# Patient Record
Sex: MALE | Race: BLACK OR AFRICAN AMERICAN | NOT HISPANIC OR LATINO
[De-identification: names, ages, dates, MRNs, and addresses within clinical notes are randomized per-mention and may not be internally consistent; named-entity substitution may affect disease eponyms.]

---

## 2019-06-24 ENCOUNTER — RESULT REVIEW (OUTPATIENT)
Age: 34
End: 2019-06-24

## 2019-06-24 ENCOUNTER — OUTPATIENT (OUTPATIENT)
Dept: OUTPATIENT SERVICES | Facility: HOSPITAL | Age: 34
LOS: 1 days | Discharge: HOME | End: 2019-06-24
Payer: MEDICAID

## 2019-06-24 PROCEDURE — 77003 FLUOROGUIDE FOR SPINE INJECT: CPT | Mod: 26

## 2019-06-24 PROCEDURE — 62270 DX LMBR SPI PNXR: CPT

## 2019-06-24 PROCEDURE — 88108 CYTOPATH CONCENTRATE TECH: CPT | Mod: 26

## 2019-06-24 NOTE — PROGRESS NOTE ADULT - SUBJECTIVE AND OBJECTIVE BOX
Interventional Radiology Outpatient Documentation    PREOPERATIVE DAY OF PROCEDURE EVALUATION:     I have personally seen and examined this patient. I agree with the history and physical which I have reviewed and noted any changes below:     Plan is for diagnostic lumbar punture.  06-24-19 @ 10:34    Procedure/ risks/ benefits/ goals/ alternatives were explained. All questions answered. Informed content obtained from patient. Consent placed in chart.

## 2019-06-24 NOTE — PROCEDURE NOTE - NSPOSTCAREGUIDE_GEN_A_CORE
Instructed patient/caregiver regarding signs and symptoms of infection/Verbal/written post procedure instructions were given to patient/caregiver/Instructed patient/caregiver to follow-up with primary care physician/Keep the cast/splint/dressing clean and dry

## 2019-06-24 NOTE — PROCEDURE NOTE - NSPROCNAME_GEN_A_CORE
Lumbar Puncture [Initial Consultation] : an initial consultation for [Febrile Seizure] : febrile seizure [Mother] : mother

## 2019-06-24 NOTE — PROCEDURE NOTE - ADDITIONAL PROCEDURE DETAILS
Diagnostic LP at L3-L4  opening pressure 29  drained 15.5 cc of clear CSF.  Pt tolerated procedure well.

## 2019-06-27 DIAGNOSIS — G35 MULTIPLE SCLEROSIS: ICD-10-CM

## 2019-06-27 DIAGNOSIS — G51.0 BELL'S PALSY: ICD-10-CM

## 2019-09-17 ENCOUNTER — APPOINTMENT (OUTPATIENT)
Dept: DERMATOLOGY | Facility: CLINIC | Age: 34
End: 2019-09-17

## 2020-11-19 ENCOUNTER — OUTPATIENT (OUTPATIENT)
Dept: OUTPATIENT SERVICES | Facility: HOSPITAL | Age: 35
LOS: 1 days | Discharge: HOME | End: 2020-11-19

## 2020-11-19 ENCOUNTER — APPOINTMENT (OUTPATIENT)
Dept: INTERNAL MEDICINE | Facility: CLINIC | Age: 35
End: 2020-11-19
Payer: MEDICAID

## 2020-11-19 VITALS
HEART RATE: 104 BPM | BODY MASS INDEX: 35.65 KG/M2 | TEMPERATURE: 98.9 F | OXYGEN SATURATION: 95 % | HEIGHT: 73 IN | WEIGHT: 269 LBS | DIASTOLIC BLOOD PRESSURE: 91 MMHG | SYSTOLIC BLOOD PRESSURE: 119 MMHG

## 2020-11-19 DIAGNOSIS — F17.200 NICOTINE DEPENDENCE, UNSPECIFIED, UNCOMPLICATED: ICD-10-CM

## 2020-11-19 DIAGNOSIS — Z86.39 PERSONAL HISTORY OF OTHER ENDOCRINE, NUTRITIONAL AND METABOLIC DISEASE: ICD-10-CM

## 2020-11-19 DIAGNOSIS — Z78.9 OTHER SPECIFIED HEALTH STATUS: ICD-10-CM

## 2020-11-19 DIAGNOSIS — Z23 ENCOUNTER FOR IMMUNIZATION: ICD-10-CM

## 2020-11-19 DIAGNOSIS — Z82.49 FAMILY HISTORY OF ISCHEMIC HEART DISEASE AND OTHER DISEASES OF THE CIRCULATORY SYSTEM: ICD-10-CM

## 2020-11-19 DIAGNOSIS — Z72.0 TOBACCO USE: ICD-10-CM

## 2020-11-19 PROCEDURE — 99203 OFFICE O/P NEW LOW 30 MIN: CPT | Mod: GC

## 2020-11-19 NOTE — REVIEW OF SYSTEMS
[Fever] : no fever [Recent Change In Weight] : ~T no recent weight change [Vision Problems] : no vision problems [Hearing Loss] : no hearing loss [Chest Pain] : no chest pain [Palpitations] : no palpitations [Shortness Of Breath] : no shortness of breath [Wheezing] : no wheezing [Cough] : no cough [Abdominal Pain] : no abdominal pain [Nausea] : no nausea [Constipation] : no constipation [Diarrhea] : no diarrhea [Vomiting] : no vomiting [Dysuria] : no dysuria [Hematuria] : no hematuria [Joint Pain] : no joint pain [Muscle Pain] : no muscle pain [Headache] : no headache [Dizziness] : no dizziness [Anxiety] : no anxiety [Depression] : no depression

## 2020-11-19 NOTE — PHYSICAL EXAM
[No Acute Distress] : no acute distress [Normal Sclera/Conjunctiva] : normal sclera/conjunctiva [PERRL] : pupils equal round and reactive to light [EOMI] : extraocular movements intact [Normal Oropharynx] : the oropharynx was normal [Normal TMs] : both tympanic membranes were normal [Normal Nasal Mucosa] : the nasal mucosa was normal [No Respiratory Distress] : no respiratory distress  [No Accessory Muscle Use] : no accessory muscle use [Clear to Auscultation] : lungs were clear to auscultation bilaterally [Normal Rate] : normal rate  [Regular Rhythm] : with a regular rhythm [Normal S1, S2] : normal S1 and S2 [No Edema] : there was no peripheral edema [Soft] : abdomen soft [Non Tender] : non-tender [Non-distended] : non-distended [No HSM] : no HSM [Normal Bowel Sounds] : normal bowel sounds [No Focal Deficits] : no focal deficits [Normal Affect] : the affect was normal [Alert and Oriented x3] : oriented to person, place, and time [Normal Mood] : the mood was normal [Normal Insight/Judgement] : insight and judgment were intact [de-identified] : 2+ radial pulses b/l  [de-identified] : Left sided facial droop

## 2020-11-19 NOTE — ASSESSMENT
[FreeTextEntry1] : 36yo male from [facility] with pmhx of hyperlipidemia, vit D deficiency, Bell's palsy, obesity, tobacco use disorder, and psych hx of schizoaffective disorder - bipolar type presents to office as a new patient to establish care. \par \par #schizoaffective disorder, bipolar type - stable\par - from [facility]\par - risperidone 3mg hs, risperidone 2mg daily, quetiapine 400mg hs\par \par #bell's palsy\par - since 19yo \par - neurology referral \par \par #hyperlipidemia\par - on atorvastatin 10mg hs, renewed\par - will send lipid profile\par \par #morbid obesity\par - BMI 35\par - counselled on weight loss and diet \par \par #tobacco use disorder\par - currently uses 4 cigarrettes a day, started when he was 16yo and started with 1-2 cigarrettes\par - states he will think about cutting down, seems willing\par \par #healthcare maintenance\par - renewed meds\par - will do routine labs: CBC, CMP, lipid profile, A1C, vit D, TSH\par - f/u 6 months \par - had flu shot 2 months ago \par \par

## 2020-11-19 NOTE — HISTORY OF PRESENT ILLNESS
[FreeTextEntry1] : new visit, establish care  [de-identified] : 34yo male from [facility] with pmhx of hyperlipidemia, vit D deficiency, Bell's palsy, obesity, tobacco use disorder, and psych hx of schizoaffective disorder - bipolar type presents to office as a new patient to establish care. \par \par Patient needs renewal on atovastatin and vit D. Patient has diagnosis of bells palsy which started when he was 19yo and patient states there has been no improvement or worsening since. States he hasn't had any tests done for it yet. Otherwise patient has no complaints and is doing well on his psych meds.

## 2020-11-23 DIAGNOSIS — F25.0 SCHIZOAFFECTIVE DISORDER, BIPOLAR TYPE: ICD-10-CM

## 2020-11-23 DIAGNOSIS — E66.9 OBESITY, UNSPECIFIED: ICD-10-CM

## 2020-11-23 DIAGNOSIS — G51.0 BELL'S PALSY: ICD-10-CM

## 2020-11-23 DIAGNOSIS — E55.9 VITAMIN D DEFICIENCY, UNSPECIFIED: ICD-10-CM

## 2020-11-23 DIAGNOSIS — F17.200 NICOTINE DEPENDENCE, UNSPECIFIED, UNCOMPLICATED: ICD-10-CM

## 2020-11-23 DIAGNOSIS — Z00.00 ENCOUNTER FOR GENERAL ADULT MEDICAL EXAMINATION WITHOUT ABNORMAL FINDINGS: ICD-10-CM

## 2020-11-23 DIAGNOSIS — E78.5 HYPERLIPIDEMIA, UNSPECIFIED: ICD-10-CM

## 2020-12-09 LAB
25(OH)D3 SERPL-MCNC: 41 NG/ML
ALBUMIN SERPL ELPH-MCNC: 4.4 G/DL
ALP BLD-CCNC: 148 U/L
ALT SERPL-CCNC: 17 U/L
ANION GAP SERPL CALC-SCNC: 17 MMOL/L
AST SERPL-CCNC: 18 U/L
BASOPHILS # BLD AUTO: 0.05 K/UL
BASOPHILS NFR BLD AUTO: 0.7 %
BILIRUB SERPL-MCNC: 0.5 MG/DL
BUN SERPL-MCNC: 14 MG/DL
CALCIUM SERPL-MCNC: 9.9 MG/DL
CHLORIDE SERPL-SCNC: 100 MMOL/L
CHOLEST SERPL-MCNC: 170 MG/DL
CO2 SERPL-SCNC: 20 MMOL/L
CREAT SERPL-MCNC: 1.3 MG/DL
EOSINOPHIL # BLD AUTO: 0.35 K/UL
EOSINOPHIL NFR BLD AUTO: 4.7 %
ESTIMATED AVERAGE GLUCOSE: 117 MG/DL
GLUCOSE SERPL-MCNC: 98 MG/DL
HBA1C MFR BLD HPLC: 5.7 %
HCT VFR BLD CALC: 42.5 %
HDLC SERPL-MCNC: 44 MG/DL
HGB BLD-MCNC: 13.8 G/DL
IMM GRANULOCYTES NFR BLD AUTO: 0.3 %
LDLC SERPL CALC-MCNC: 110 MG/DL
LYMPHOCYTES # BLD AUTO: 2.77 K/UL
LYMPHOCYTES NFR BLD AUTO: 37 %
MAN DIFF?: NORMAL
MCHC RBC-ENTMCNC: 26.4 PG
MCHC RBC-ENTMCNC: 32.5 G/DL
MCV RBC AUTO: 81.3 FL
MONOCYTES # BLD AUTO: 0.93 K/UL
MONOCYTES NFR BLD AUTO: 12.4 %
NEUTROPHILS # BLD AUTO: 3.37 K/UL
NEUTROPHILS NFR BLD AUTO: 44.9 %
NONHDLC SERPL-MCNC: 126 MG/DL
PLATELET # BLD AUTO: 251 K/UL
POTASSIUM SERPL-SCNC: 4.6 MMOL/L
PROT SERPL-MCNC: 7.8 G/DL
RBC # BLD: 5.23 M/UL
RBC # FLD: 15.7 %
SODIUM SERPL-SCNC: 137 MMOL/L
TRIGL SERPL-MCNC: 106 MG/DL
TSH SERPL-ACNC: 0.85 UIU/ML
WBC # FLD AUTO: 7.49 K/UL

## 2020-12-22 ENCOUNTER — APPOINTMENT (OUTPATIENT)
Dept: OPHTHALMOLOGY | Facility: CLINIC | Age: 35
End: 2020-12-22

## 2021-03-30 ENCOUNTER — OUTPATIENT (OUTPATIENT)
Dept: OUTPATIENT SERVICES | Facility: HOSPITAL | Age: 36
LOS: 1 days | Discharge: HOME | End: 2021-03-30

## 2021-03-30 ENCOUNTER — APPOINTMENT (OUTPATIENT)
Dept: NEUROLOGY | Facility: CLINIC | Age: 36
End: 2021-03-30
Payer: MEDICAID

## 2021-03-30 VITALS
OXYGEN SATURATION: 97 % | HEART RATE: 96 BPM | HEIGHT: 73 IN | WEIGHT: 225 LBS | DIASTOLIC BLOOD PRESSURE: 87 MMHG | TEMPERATURE: 98.4 F | SYSTOLIC BLOOD PRESSURE: 131 MMHG | BODY MASS INDEX: 29.82 KG/M2

## 2021-03-30 DIAGNOSIS — G37.9 DEMYELINATING DISEASE OF CENTRAL NERVOUS SYSTEM, UNSPECIFIED: ICD-10-CM

## 2021-03-30 PROCEDURE — 99204 OFFICE O/P NEW MOD 45 MIN: CPT | Mod: GC

## 2021-03-31 NOTE — PHYSICAL EXAM
[FreeTextEntry1] : Mental status: Awake, alert and oriented x3.  Recent and remote memory intact.\par Cranial nerves: + L sided LMN facial paralysis. Pupils equally round and reactive to light, no nystagmus, extraocular muscles intact,\par Motor:  MRC grading 5/5 b/l UE/LE.   strength 5/5.  Normal tone and bulk.  No abnormal movements.  \par Sensation: Intact to light touch, proprioception, and pinprick. \par Coordination: No dysmetria on finger-to-nose and heel-to-shin.\par Reflexes: 2+ in bilateral UE/LE, downgoing toes bilaterally. (-) Márquez.\par Gait: Narrow and steady. No ataxia.  Romberg negative\par \par

## 2021-03-31 NOTE — HISTORY OF PRESENT ILLNESS
Called and talked to patient explained procedure for follow up he already needs a repeat CT of chest for heart related problem in 6 weeks so I made an appointment for 8/5/17 with Dr Melissa Beverly [FreeTextEntry1] : The patient is a 35 year old male with history of schizoaffective disorder who currently resides at [facility] and [facility] 15 years ago who was referred by his PCP for neurology evaluation. Patient is a poor historian and is unable to explain why he was sent for a neurology consult today. He states he is for his [place] [facility] but denies any recurrence or worsening of symptoms. He comes with the following records from outside facilities. He reports he has never been told he has multiple sclerosis or been treated for multiple sclerosis. \par \par CT head w and wo contrast 1/31/2019\par Impression: 1 cm low density in the right parietal centrum semiovae. The finding is nonspecific but a focus of demyelination cannot be excluded in a patient of the stated age. MRI of the brain w and wo contrast may provide further elucidation. \par \par MR head w and wo contrast 2/13/2019\par IMpression: \par There is a dominant lesion in the right medial parietal lobe, above the right atrium of lateral ventricle, measuring up to 1.6x1.1 cm in size. A tiny 3 mm lesion is seen just inferior to this as well. A few additional tiny, less than 3 mm subcortical white matter lesions are seen in the frontal lobe. There is no restricted diffusion to suggest acute infarction. The single dominant lesion is nonspecific, but the prominent size and location is worrisome for a demyelinating lesion, and the clinical correlation is recommended. There is no abnormal enhancement to suggest acute demyelination. Otherwise unremarkable. \par \par MR c spine without contrast 3/18/2019\par Impression: there is central disc herniation at C4-C5 with moderate flattening of the cord shadow which measures 5.5 mm on axial image 41 and on sagittal image 7 without evidence of myelopathic signal change. \par 2. The spinal cord is otherwise normal. \par 3. There are no regions of abnormal enhancement. \par \par MR thoracic spine w and wo contrast 3/18/2019\par Impression: the spinal cord is normal. There is no suggestion of demyelinating disease. \par 2. there are no regions of abnormal enhancement. \par \par Record of neurology consult 7/2019 says LP negative for MS.\par \par MR head w and wo contrast 8/12/2019\par IMpression: There is a 1.5 cm lesion adjacent to the posterior body of the right lateral ventricle/splenium of the corpus callosum c/w a demyelinating plaque in this patient with known multiple sclerosis, similar to prior study. \par \par MR Cervical spine with contrast 10/4/2019\par IMpression: 5 mm central disc protrusion at C4-C5 with moderate mass effect on the anterior margin of the cervical sinal cord, similar to the prior study. \par \par MR thoracic spine w and wo contrast 10/4/2019\par impression: no significant abnormality of the thoracic spine. no evidence of demyelinating process. \par \par \par MRI Head w and wo contrast from [facility] 12/2019\par Impression: 1. Examination reveals stable appearance of 11.6 cm rounded T2 hyperintense demyelinating plaque in the right deep parietal periventricular white matter in this patient with a known history of multiple sclerosis. No restricted diffusion or abnormal contrast enhancement is seen to suggest active lesion. No new lesions are seen. \par 2. The remained of the brain parenchyma is unremarkable. No acute infarction, masses, or hemorrhage is seen. No acute intracranial abnormality is identified. There has been no adverse interval change since the previous study. \par 3. Bilateral optic nerves are normal in thickness and signal intensity. No abnormal contrast enhancement is seen. There is no evidence for optic neuritis. \par \par Manual muscle testing/NCS/EMG from [facility] 10/9/2019\par Impression: \par 1. 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 \par 2. C5-C6,m C7 and C8-T1 radiculopathies. \par 3. Continue current management. \par 4. f/u with referring physician. \par 5. PHysical therapy to alleviate pain and muscle spasm, to prevent stiffness, to improve blood circulation, promote healing, and restore normal spine and joint mobility in the affected area consisting of therapeutic exercises. \par \par Manual muscle testing/NCS/EMG from [facility] 11/15/2019\par Impression: \par 1. 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 \par 2. Continue current management of symptoms \par 3. FOllow up with referring physicians\par 4. Physical Therapy: to alleviate pain and muscle spasm, to prevent stiffness, to improve blood circulation, promote healing, and restore normal spine and joint mobility in the affected area consisting of therapeutic exercises. \par

## 2021-03-31 NOTE — ASSESSMENT
[FreeTextEntry1] : 35 year old male with history of schizoaffective disorder and Bulan palsy who currently resides at [facility] with questionable history of multiple sclerosis based on outside medical records. Patient has no acute complaint. Neurological exam is normal with the exception of left facial paralysis 2/2 bell's palsy. \par \par # demyelinating lesions of CNS and PNS, ?MS diagnosis\par repeat testing to evaluate disease progression/ r/o MS\par - repeat MRI of brain and cervical spine w/wo contrast \par - repeat NCS/EMG: Unlikely to be correct. Since patient's exam is normal and CNS should not affect the PNS \par \par # bells palsy episode 15 years ago\par - never treated\par - no acute changes\par - no indication for medical treatment

## 2021-04-16 LAB
ANION GAP SERPL CALC-SCNC: 9 MMOL/L
BUN SERPL-MCNC: 12 MG/DL
CALCIUM SERPL-MCNC: 9.7 MG/DL
CHLORIDE SERPL-SCNC: 102 MMOL/L
CO2 SERPL-SCNC: 25 MMOL/L
CREAT SERPL-MCNC: 1.3 MG/DL
GLUCOSE SERPL-MCNC: 102 MG/DL
POTASSIUM SERPL-SCNC: 4.3 MMOL/L
SODIUM SERPL-SCNC: 136 MMOL/L

## 2021-04-21 ENCOUNTER — OUTPATIENT (OUTPATIENT)
Dept: OUTPATIENT SERVICES | Facility: HOSPITAL | Age: 36
LOS: 1 days | Discharge: HOME | End: 2021-04-21

## 2021-04-21 ENCOUNTER — APPOINTMENT (OUTPATIENT)
Dept: INTERNAL MEDICINE | Facility: CLINIC | Age: 36
End: 2021-04-21
Payer: MEDICAID

## 2021-04-21 VITALS
TEMPERATURE: 97.3 F | SYSTOLIC BLOOD PRESSURE: 122 MMHG | HEART RATE: 101 BPM | DIASTOLIC BLOOD PRESSURE: 88 MMHG | HEIGHT: 73 IN | BODY MASS INDEX: 35.25 KG/M2 | OXYGEN SATURATION: 95 % | WEIGHT: 266 LBS

## 2021-04-21 DIAGNOSIS — Z00.00 ENCOUNTER FOR GENERAL ADULT MEDICAL EXAMINATION W/OUT ABNORMAL FINDINGS: ICD-10-CM

## 2021-04-21 DIAGNOSIS — E55.9 VITAMIN D DEFICIENCY, UNSPECIFIED: ICD-10-CM

## 2021-04-21 PROCEDURE — 99214 OFFICE O/P EST MOD 30 MIN: CPT

## 2021-04-21 NOTE — HISTORY OF PRESENT ILLNESS
[FreeTextEntry1] : Follow up.  Medication refill. [de-identified] : 36yo male from [facility] with pmhx of hyperlipidemia, vit D deficiency, Bell's palsy (left sided facial droop), obesity, tobacco use disorder, and psych hx of schizoaffective disorder - bipolar type presents to office as a follow up visit.  Was last seen in the clinic by Dr. Arreguin in 11/2020.  \par \par Patient needs renewal on atorvastatin and vit D. Patient has diagnosis of bells palsy which started when he was 21yo and patient states that it has been stable.   He is being seen by Dr. Manzo.  Will follow up with him.  Otherwise patient has no complaints and is doing well on his psych meds.  Smokes 4 cigs/day for past 9 years.  Smokes marijuana daily.

## 2021-04-21 NOTE — ASSESSMENT
[FreeTextEntry1] : 36yo male from [facility] with pmhx of hyperlipidemia, vit D deficiency, Bell's palsy, obesity, tobacco use disorder, and psych hx of schizoaffective disorder - bipolar type presents to office as a new patient to establish care. \par \par #schizoaffective disorder, bipolar type - stable\par - from [facility]\par - risperidone 3mg hs, risperidone 2mg daily, quetiapine 400mg hs\par \par #bell's palsy/ Demyelinating disease\par - since 19yo \par - Sees Dr. Pham (Neurology) who recommended MRI and repeat NCS\par - Will follow up with Neuro\par \par #hyperlipidemia\par - on atorvastatin 10mg hs, renewed\par - will send repeat lipid profile 12/2020  (elev) HbA1c 5.7\par \par #morbid obesity\par - BMI 35\par - counselled on weight loss and diet \par \par #tobacco use disorder/ Marijuana use\par - currently uses 4 cigarrettes a day, started when he was 16yo and started with 1-2 cigarrettes\par - states he will think about cutting down, seems willing\par \par #healthcare maintenance\par - renewed meds\par - will do repeat routine labs: CBC, CMP, lipid profile, A1C, vit D, TSH\par - f/u 6 months \par - had flu shot 2020\par \par

## 2021-04-28 DIAGNOSIS — E78.5 HYPERLIPIDEMIA, UNSPECIFIED: ICD-10-CM

## 2021-04-28 DIAGNOSIS — F25.0 SCHIZOAFFECTIVE DISORDER, BIPOLAR TYPE: ICD-10-CM

## 2021-04-28 DIAGNOSIS — E66.9 OBESITY, UNSPECIFIED: ICD-10-CM

## 2021-04-28 DIAGNOSIS — G51.0 BELL'S PALSY: ICD-10-CM

## 2021-04-28 DIAGNOSIS — Z00.00 ENCOUNTER FOR GENERAL ADULT MEDICAL EXAMINATION WITHOUT ABNORMAL FINDINGS: ICD-10-CM

## 2021-04-28 DIAGNOSIS — E55.9 VITAMIN D DEFICIENCY, UNSPECIFIED: ICD-10-CM

## 2021-06-01 ENCOUNTER — APPOINTMENT (OUTPATIENT)
Dept: NEUROLOGY | Facility: CLINIC | Age: 36
End: 2021-06-01

## 2021-06-07 RX ORDER — ATORVASTATIN CALCIUM 10 MG/1
10 TABLET, FILM COATED ORAL
Qty: 30 | Refills: 5 | Status: ACTIVE | COMMUNITY
Start: 2020-11-19 | End: 1900-01-01

## 2021-10-18 RX ORDER — MULTIVIT-MIN/FOLIC/VIT K/LYCOP 400-300MCG
25 MCG TABLET ORAL DAILY
Qty: 30 | Refills: 3 | Status: ACTIVE | COMMUNITY
Start: 2020-11-19 | End: 1900-01-01

## 2021-10-27 ENCOUNTER — APPOINTMENT (OUTPATIENT)
Dept: INTERNAL MEDICINE | Facility: CLINIC | Age: 36
End: 2021-10-27

## 2022-02-01 ENCOUNTER — APPOINTMENT (OUTPATIENT)
Dept: NEUROLOGY | Facility: CLINIC | Age: 37
End: 2022-02-01

## 2022-08-02 ENCOUNTER — RESULT REVIEW (OUTPATIENT)
Age: 37
End: 2022-08-02

## 2022-08-02 ENCOUNTER — OUTPATIENT (OUTPATIENT)
Dept: OUTPATIENT SERVICES | Facility: HOSPITAL | Age: 37
LOS: 1 days | Discharge: HOME | End: 2022-08-02

## 2022-08-02 DIAGNOSIS — G37.9 DEMYELINATING DISEASE OF CENTRAL NERVOUS SYSTEM, UNSPECIFIED: ICD-10-CM

## 2022-08-02 PROCEDURE — 70553 MRI BRAIN STEM W/O & W/DYE: CPT | Mod: 26

## 2022-08-02 PROCEDURE — 72156 MRI NECK SPINE W/O & W/DYE: CPT | Mod: 26

## 2022-08-23 ENCOUNTER — APPOINTMENT (OUTPATIENT)
Dept: NEUROLOGY | Facility: CLINIC | Age: 37
End: 2022-08-23

## 2022-08-23 ENCOUNTER — OUTPATIENT (OUTPATIENT)
Dept: OUTPATIENT SERVICES | Facility: HOSPITAL | Age: 37
LOS: 1 days | Discharge: HOME | End: 2022-08-23

## 2022-08-23 VITALS
BODY MASS INDEX: 34.99 KG/M2 | WEIGHT: 264 LBS | TEMPERATURE: 97.4 F | OXYGEN SATURATION: 98 % | HEIGHT: 73 IN | SYSTOLIC BLOOD PRESSURE: 144 MMHG | HEART RATE: 89 BPM | DIASTOLIC BLOOD PRESSURE: 93 MMHG

## 2022-08-23 PROCEDURE — 99213 OFFICE O/P EST LOW 20 MIN: CPT | Mod: GC

## 2022-08-23 NOTE — PHYSICAL EXAM
[Cranial Nerves Optic (II)] : visual acuity intact bilaterally,  visual fields full to confrontation, pupils equal round and reactive to light [Cranial Nerves Oculomotor (III)] : extraocular motion intact [Cranial Nerves Trigeminal (V)] : facial sensation intact symmetrically [Cranial Nerves Glossopharyngeal (IX)] : tongue and palate midline [Cranial Nerves Accessory (XI - Cranial And Spinal)] : head turning and shoulder shrug symmetric [Cranial Nerves Hypoglossal (XII)] : there was no tongue deviation with protrusion [Motor Tone] : muscle tone was normal in all four extremities [Motor Strength] : muscle strength was normal in all four extremities [Involuntary Movements] : no involuntary movements were seen [Motor Handedness Right-Handed] : the patient is right hand dominant [Sensation Tactile Decrease] : light touch was intact [Abnormal Walk] : normal gait [Coordination - Dysmetria Impaired Finger-to-Nose Bilateral] : not present [FreeTextEntry5] : Left facial LMN weakness

## 2022-08-23 NOTE — END OF VISIT
[] : Resident [FreeTextEntry3] : Visited the patient with resident. Patient with history of CNS demyelinating disease present as a follow up visit. Since last visit MRI and cervical MRI resulted. Showed no acute demyelinating disease but there are bilateral periventricular lesion and few scatted demyelinating punctate changes suspected for CNS demyelinating disease. Does not meet the  Angel Luis criteria. Patient is asymptomatic except left LMN [place] palsy. Normal and sensory exam. At this point, recommend to continue to monitor the patient clinically. Repeat MRI brain, cervical spine and thoracic spine in 6 months w/wo for now and would consider IMT if there was a progress

## 2022-08-23 NOTE — ASSESSMENT
[FreeTextEntry1] : 37 years old male is here for follow up on previous H/O demyelinating disease. His recent MRI of brain 08/02/22:  concerning for demyelinating disease but no active enhancing lesion and its not fully consistent with MS, CSF study from 2019: no OGB and normal IgG index. No lab work up for MBP, MOG or NMO.. Patient never had any flare up symptoms, still asymptomatic. Neuro exam: unremarkable except the chronic facial nerve palsy. \par \par Plan:\par - Will repeat MRI of brain , C-spine and also T-spine in 6 months to assess the progression of lesions. should be done w/wo contrast \par - If lesions are progressive will consider treatment with disease modifying disease like Vumerity.\par - Will also obtain NMO ab, Anti-MOG Ab today.\par -  RTC in 6 months following the MRIs.\par -

## 2022-08-23 NOTE — HISTORY OF PRESENT ILLNESS
[FreeTextEntry1] : 35 year old male with history of schizoaffective disorder who currently resides at [facility] and [facility] 15 years ago is here follow up. He was first seen in March, 2022 for evaluation, outside records revealed questionable Dx of MS? vs other demyelinating disease. He has been doing well since last visit. Denies any blurry vision, weakness or numbness. Denies any trouble with walking, ambulate independently. \par \par Since last visit Brain MRI w/wo and cervical spine MRI w/wo were done. Brain MRI showed 1.7 cm focus of FLAIR signal hyperintensity within the right periventricular white matter extending into the corpus callosum with volume loss and dditional faint FLAIR signal hyperintensity within the left periventricular white matter in corpus callosum with volume loss; and a few scattered punctate foci of subcortical white matter signal abnormality suggestive of CNS demyelinating disease. His CSF study showed negative OCB and normal IgG index.

## 2022-10-20 ENCOUNTER — APPOINTMENT (OUTPATIENT)
Dept: UROLOGY | Facility: CLINIC | Age: 37
End: 2022-10-20

## 2022-10-20 ENCOUNTER — OUTPATIENT (OUTPATIENT)
Dept: OUTPATIENT SERVICES | Facility: HOSPITAL | Age: 37
LOS: 1 days | Discharge: HOME | End: 2022-10-20

## 2022-10-20 ENCOUNTER — NON-APPOINTMENT (OUTPATIENT)
Age: 37
End: 2022-10-20

## 2022-10-20 VITALS
WEIGHT: 252 LBS | BODY MASS INDEX: 33.4 KG/M2 | HEART RATE: 93 BPM | OXYGEN SATURATION: 99 % | HEIGHT: 73 IN | DIASTOLIC BLOOD PRESSURE: 105 MMHG | SYSTOLIC BLOOD PRESSURE: 152 MMHG

## 2022-10-20 DIAGNOSIS — N52.8 OTHER MALE ERECTILE DYSFUNCTION: ICD-10-CM

## 2022-10-20 DIAGNOSIS — G51.0 BELL'S PALSY: ICD-10-CM

## 2022-10-20 DIAGNOSIS — F25.0 SCHIZOAFFECTIVE DISORDER, BIPOLAR TYPE: ICD-10-CM

## 2022-10-20 DIAGNOSIS — G37.9 DEMYELINATING DISEASE OF CENTRAL NERVOUS SYSTEM, UNSPECIFIED: ICD-10-CM

## 2022-10-20 PROCEDURE — 99202 OFFICE O/P NEW SF 15 MIN: CPT

## 2022-10-20 NOTE — HISTORY OF PRESENT ILLNESS
[FreeTextEntry1] : 37-year-old male who complains of inability to have adequate erections.  He does have some erections with stimulation but has had difficulty penetrating.  This is a relatively recent thing.  The patient has a history of schizoaffective disorder and is being worked up for possible neuromyelitis optica.  He is currently on risperidone and A statin.  He denies any hematuria dysuria UTIs or stones or any significant voiding related issues.

## 2022-10-20 NOTE — ASSESSMENT
[FreeTextEntry1] : 37-year-old male with schizoaffective disorder and possibility of demyelinating disease who complains of erectile dysfunction.  He is able to get some erections but they are not firm and he feels for intercourse.  We would need to find out from the facility whether there were any contraindications of his using sildenafil or tadalafil and a low dose.  If there are none then that could be tried.  He was interested in finding out if there were any other tests that needed to be done and have suggested a referral with Dr. Cottrell our erectile dysfunction expert to see if anything further needs to be done at present.  All his questions were otherwise answered.

## 2022-10-20 NOTE — PHYSICAL EXAM
[General Appearance - Well Nourished] : well nourished [Normal Appearance] : normal appearance [General Appearance - In No Acute Distress] : no acute distress [Oriented To Time, Place, And Person] : oriented to person, place, and time

## 2022-12-01 ENCOUNTER — OUTPATIENT (OUTPATIENT)
Dept: OUTPATIENT SERVICES | Facility: HOSPITAL | Age: 37
LOS: 1 days | Discharge: HOME | End: 2022-12-01

## 2022-12-01 ENCOUNTER — APPOINTMENT (OUTPATIENT)
Dept: UROLOGY | Facility: CLINIC | Age: 37
End: 2022-12-01

## 2022-12-01 VITALS
TEMPERATURE: 97.6 F | OXYGEN SATURATION: 97 % | HEART RATE: 95 BPM | WEIGHT: 265 LBS | HEIGHT: 73 IN | BODY MASS INDEX: 35.12 KG/M2 | SYSTOLIC BLOOD PRESSURE: 146 MMHG | DIASTOLIC BLOOD PRESSURE: 105 MMHG

## 2022-12-01 DIAGNOSIS — F25.0 SCHIZOAFFECTIVE DISORDER, BIPOLAR TYPE: ICD-10-CM

## 2022-12-01 DIAGNOSIS — N52.8 OTHER MALE ERECTILE DYSFUNCTION: ICD-10-CM

## 2022-12-01 DIAGNOSIS — N50.0 ATROPHY OF TESTIS: ICD-10-CM

## 2022-12-01 PROCEDURE — 99214 OFFICE O/P EST MOD 30 MIN: CPT

## 2022-12-06 LAB
ALBUMIN SERPL ELPH-MCNC: 4.2 G/DL
ALP BLD-CCNC: 145 U/L
ALT SERPL-CCNC: 15 U/L
AST SERPL-CCNC: 17 U/L
BASOPHILS # BLD AUTO: 0.06 K/UL
BASOPHILS NFR BLD AUTO: 0.8 %
BILIRUB DIRECT SERPL-MCNC: <0.2 MG/DL
BILIRUB INDIRECT SERPL-MCNC: >0.3 MG/DL
BILIRUB SERPL-MCNC: 0.5 MG/DL
EOSINOPHIL # BLD AUTO: 0.45 K/UL
EOSINOPHIL NFR BLD AUTO: 6 %
HCT VFR BLD CALC: 41.3 %
HGB BLD-MCNC: 13.7 G/DL
IMM GRANULOCYTES NFR BLD AUTO: 0.4 %
LYMPHOCYTES # BLD AUTO: 2.74 K/UL
LYMPHOCYTES NFR BLD AUTO: 36.6 %
MAN DIFF?: NORMAL
MCHC RBC-ENTMCNC: 27.2 PG
MCHC RBC-ENTMCNC: 33.2 G/DL
MCV RBC AUTO: 81.9 FL
MONOCYTES # BLD AUTO: 0.82 K/UL
MONOCYTES NFR BLD AUTO: 11 %
NEUTROPHILS # BLD AUTO: 3.38 K/UL
NEUTROPHILS NFR BLD AUTO: 45.2 %
PLATELET # BLD AUTO: 236 K/UL
PROT SERPL-MCNC: 7.5 G/DL
RBC # BLD: 5.04 M/UL
RBC # FLD: 15 %
WBC # FLD AUTO: 7.48 K/UL

## 2022-12-06 NOTE — HISTORY OF PRESENT ILLNESS
[Erectile Dysfunction] : Erectile Dysfunction [FreeTextEntry1] : James is a 37-year-old male born on August 5 1985 initially seen October 20, 2022 by Dr. Weber complaining of insufficient rigidity of his erections.  Patient has a history of schizoaffective disorder possibly neuromyelitis and denies any other voiding issues.\par \par Questions were addressed to the Center where he lives to see if there are any contraindications to PDE 5 inhibitors and the patient was to get a note and come back possibly for subspecialty consultation.\par \par No blood tests were done they are available to me in over a year\par \par He was not able to get a note from the residence.

## 2022-12-06 NOTE — ASSESSMENT
[FreeTextEntry1] : He says he was not able to get a note from the residents I will fill out a request that we will go to them then and/or his primary care doctor to see if his possible comorbid conditions and/or his place of residence are contraindications to PDE fives.\par \par From a urologic side he has testicular atrophy and has morbid obesity going to want hormone studies to see if there is hypergonadism as if so that is a medical issue let alone a psychosexual issue

## 2022-12-06 NOTE — LETTER HEADER
[FreeTextEntry3] : Jolly Hurtado M.D.\par Director Emeritus of Urology\par [facility]\par [address] [place] [address], [address]\[place] [address]

## 2022-12-06 NOTE — PHYSICAL EXAM
[General Appearance - Well Developed] : well developed [General Appearance - Well Nourished] : well nourished [Normal Appearance] : normal appearance [Well Groomed] : well groomed [General Appearance - In No Acute Distress] : no acute distress [Abdomen Soft] : soft [Abdomen Tenderness] : non-tender [Abdomen Hernia] : no hernia was discovered [Costovertebral Angle Tenderness] : no ~M costovertebral angle tenderness [Penis Abnormality] : normal circumcised penis [] : no respiratory distress [Respiration, Rhythm And Depth] : normal respiratory rhythm and effort [Exaggerated Use Of Accessory Muscles For Inspiration] : no accessory muscle use [Oriented To Time, Place, And Person] : oriented to person, place, and time [Affect] : the affect was normal [Mood] : the mood was normal [Not Anxious] : not anxious [Normal Station and Gait] : the gait and station were normal for the patient's age [FreeTextEntry1] : Normal cavernosal size, bilateral testicular atrophy

## 2022-12-07 ENCOUNTER — NON-APPOINTMENT (OUTPATIENT)
Age: 37
End: 2022-12-07

## 2022-12-07 DIAGNOSIS — G51.0 BELL'S PALSY: ICD-10-CM

## 2022-12-07 DIAGNOSIS — F25.0 SCHIZOAFFECTIVE DISORDER, BIPOLAR TYPE: ICD-10-CM

## 2022-12-07 LAB
ESTRADIOL SERPL-MCNC: 10 PG/ML
LH SERPL-ACNC: 4.8 IU/L

## 2022-12-08 LAB — PROLACTIN SERPL-MCNC: 99.8 NG/ML

## 2022-12-09 LAB
TESTOSTERONE FREE/WEAKLY BND: 29.1 NG/DL
TESTOSTERONE TOTAL S: 102 NG/DL
TESTOSTERONE, % FREE/WEAKLY BND: 28.5 %

## 2022-12-12 LAB
SHBG SERPL-SCNC: 21.7 NMOL/L
TESTOST FREE SERPL-MCNC: 3.5 PG/ML
TESTOST SERPL-MCNC: 101 NG/DL

## 2022-12-29 ENCOUNTER — APPOINTMENT (OUTPATIENT)
Dept: UROLOGY | Facility: CLINIC | Age: 37
End: 2022-12-29

## 2023-02-10 LAB
BASOPHILS # BLD AUTO: 0.06 K/UL
BASOPHILS NFR BLD AUTO: 0.9 %
EOSINOPHIL # BLD AUTO: 0.41 K/UL
EOSINOPHIL NFR BLD AUTO: 5.8 %
ESTRADIOL SERPL-MCNC: 18 PG/ML
FSH SERPL-MCNC: 2.8 IU/L
HCT VFR BLD CALC: 45.5 %
HGB BLD-MCNC: 14.5 G/DL
IMM GRANULOCYTES NFR BLD AUTO: 0.3 %
LH SERPL-ACNC: 6.4 IU/L
LYMPHOCYTES # BLD AUTO: 2.86 K/UL
LYMPHOCYTES NFR BLD AUTO: 40.8 %
MAN DIFF?: NORMAL
MCHC RBC-ENTMCNC: 26.6 PG
MCHC RBC-ENTMCNC: 31.9 G/DL
MCV RBC AUTO: 83.5 FL
MONOCYTES # BLD AUTO: 0.62 K/UL
MONOCYTES NFR BLD AUTO: 8.8 %
NEUTROPHILS # BLD AUTO: 3.04 K/UL
NEUTROPHILS NFR BLD AUTO: 43.4 %
PLATELET # BLD AUTO: 253 K/UL
RBC # BLD: 5.45 M/UL
RBC # FLD: 15.4 %
WBC # FLD AUTO: 7.01 K/UL

## 2023-03-02 ENCOUNTER — APPOINTMENT (OUTPATIENT)
Dept: UROLOGY | Facility: CLINIC | Age: 38
End: 2023-03-02
Payer: MEDICAID

## 2023-03-02 ENCOUNTER — OUTPATIENT (OUTPATIENT)
Dept: OUTPATIENT SERVICES | Facility: HOSPITAL | Age: 38
LOS: 1 days | End: 2023-03-02
Payer: MEDICAID

## 2023-03-02 ENCOUNTER — NON-APPOINTMENT (OUTPATIENT)
Age: 38
End: 2023-03-02

## 2023-03-02 ENCOUNTER — APPOINTMENT (OUTPATIENT)
Dept: UROLOGY | Facility: CLINIC | Age: 38
End: 2023-03-02

## 2023-03-02 VITALS
HEART RATE: 112 BPM | BODY MASS INDEX: 37.37 KG/M2 | TEMPERATURE: 97.5 F | HEIGHT: 73 IN | DIASTOLIC BLOOD PRESSURE: 100 MMHG | OXYGEN SATURATION: 97 % | SYSTOLIC BLOOD PRESSURE: 152 MMHG | WEIGHT: 282 LBS

## 2023-03-02 DIAGNOSIS — Z00.00 ENCOUNTER FOR GENERAL ADULT MEDICAL EXAMINATION WITHOUT ABNORMAL FINDINGS: ICD-10-CM

## 2023-03-02 DIAGNOSIS — N52.9 MALE ERECTILE DYSFUNCTION, UNSPECIFIED: ICD-10-CM

## 2023-03-02 DIAGNOSIS — R79.89 OTHER SPECIFIED ABNORMAL FINDINGS OF BLOOD CHEMISTRY: ICD-10-CM

## 2023-03-02 PROCEDURE — 99214 OFFICE O/P EST MOD 30 MIN: CPT

## 2023-03-02 NOTE — PHYSICAL EXAM
[General Appearance - Well Developed] : well developed [General Appearance - Well Nourished] : well nourished [Normal Appearance] : normal appearance [Well Groomed] : well groomed [General Appearance - In No Acute Distress] : no acute distress [Abdomen Soft] : soft [Abdomen Tenderness] : non-tender [Abdomen Hernia] : no hernia was discovered [Costovertebral Angle Tenderness] : no ~M costovertebral angle tenderness [Penis Abnormality] : normal circumcised penis [FreeTextEntry1] : Normal cavernosal size, bilateral testicular atrophy [] : no respiratory distress [Respiration, Rhythm And Depth] : normal respiratory rhythm and effort [Exaggerated Use Of Accessory Muscles For Inspiration] : no accessory muscle use [Oriented To Time, Place, And Person] : oriented to person, place, and time [Affect] : the affect was normal [Mood] : the mood was normal [Not Anxious] : not anxious [Normal Station and Gait] : the gait and station were normal for the patient's age

## 2023-03-02 NOTE — ASSESSMENT
[FreeTextEntry1] : 36 yo with elevated prolactin\par will be seeing Endocrinology in APril\par \par test levels noted\par US of the scrotum\par f/u with Dr. Juan for further mgt

## 2023-03-02 NOTE — HISTORY OF PRESENT ILLNESS
[FreeTextEntry1] : 37 year old male initially seen October 20, 2022 by Dr. Weber complaining of insufficient rigidity of his erections.  Patient has a history of schizoaffective disorder possibly neuromyelitis and denies any other voiding issues.\par \par Prolactin \par 2/2023 - 75.5 ng/ml\par 12/2022 - 99.8 ng/ml\par \par FSH and LH - wnl\par LH - 6.4 IU/L\par FSH - 2.8 IU/L\par \par 12/2022\par SHBG - 24.7 nmoll.L\par Test / Free T - 82 / 20\par \par MRI brain - 8/2022\par 1.7 cm focus of FLAIR hyperintensity in the right periventricular \par finding compatible with demyelinating diseases [Erectile Dysfunction] : Erectile Dysfunction

## 2023-04-19 ENCOUNTER — APPOINTMENT (OUTPATIENT)
Dept: ENDOCRINOLOGY | Facility: CLINIC | Age: 38
End: 2023-04-19
Payer: MEDICAID

## 2023-04-19 ENCOUNTER — OUTPATIENT (OUTPATIENT)
Dept: OUTPATIENT SERVICES | Facility: HOSPITAL | Age: 38
LOS: 1 days | End: 2023-04-19
Payer: MEDICAID

## 2023-04-19 VITALS
HEART RATE: 121 BPM | WEIGHT: 280 LBS | SYSTOLIC BLOOD PRESSURE: 162 MMHG | BODY MASS INDEX: 36.94 KG/M2 | DIASTOLIC BLOOD PRESSURE: 102 MMHG

## 2023-04-19 DIAGNOSIS — E78.5 HYPERLIPIDEMIA, UNSPECIFIED: ICD-10-CM

## 2023-04-19 DIAGNOSIS — Z00.00 ENCOUNTER FOR GENERAL ADULT MEDICAL EXAMINATION WITHOUT ABNORMAL FINDINGS: ICD-10-CM

## 2023-04-19 DIAGNOSIS — R79.89 OTHER SPECIFIED ABNORMAL FINDINGS OF BLOOD CHEMISTRY: ICD-10-CM

## 2023-04-19 PROCEDURE — 99204 OFFICE O/P NEW MOD 45 MIN: CPT

## 2023-04-19 PROCEDURE — 99204 OFFICE O/P NEW MOD 45 MIN: CPT | Mod: GC

## 2023-04-19 RX ORDER — QUETIAPINE FUMARATE 400 MG/1
400 TABLET ORAL
Qty: 1 | Refills: 0 | Status: DISCONTINUED | COMMUNITY
Start: 2020-11-19 | End: 2023-04-19

## 2023-04-20 NOTE — REASON FOR VISIT
[Initial Evaluation] : an initial evaluation [Pituitary Evaluation/ Disorder] : pituitary evaluation/disorder [Hypogonadism] : hypogonadism

## 2023-04-20 NOTE — ASSESSMENT
[FreeTextEntry1] : 37 year old male patient with history of Bell's palsy , HTN , ? Schizoaffective disorder who present for evaluation of elevated prolactin concern for hypogonadism \par \par \par #elevated prolactin, low testosterone \par -  patient reports he was started on risperdal in 2019 when he was admitted to inpatient psych and has been on it since , he is now working with new psychiatrist to wean him off as he believes this medication made him worse .\par - he recently saw urology and was evaluated as he was having erectile dysfunction and unable to maintain erections, he has normal libido but loss of morning erections, denies taking any OTC or steroids or shots \par he has some decrease in testicular size and .\par labs showed elevated prolactin twice , testosterone low with inappropriate FSH , LH ?\par 8/2022 did not show pituitary pathology but not dedicated \par \par discussed with patient that given he is planned to be weaned off Risperdal , we will retest his pituitary function and prolactin level after 1 month being off and if levels high then will need a repeat MRI of pituitary gland and will benefit from treatment with cabergoline , meanwhile he will follow up with urology also to have work up done ( planned for scrotal US ) , and with neurology he agree with plan he will notify me if any changes, then we will do pituitary MRI earlier .

## 2023-04-20 NOTE — DATA REVIEWED
[FreeTextEntry1] : Prolactin \par 2/2023 - 75.5 ng/ml\par 12/2022 - 99.8 ng/ml\par \par FSH and LH - wnl\par LH - 6.4 IU/L\par FSH - 2.8 IU/L\par \par 12/2022\par SHBG - 24.7 nmoll.L\par Test / Free T - 82 / 20\par \par MRI brain - 8/2022\par 1.7 cm focus of FLAIR hyperintensity in the right periventricular \par finding compatible with demyelinating diseases

## 2023-04-20 NOTE — HISTORY OF PRESENT ILLNESS
[FreeTextEntry1] : 37 year old male patient with history of Bell's palsy , HTN , ? Schizoaffective disorder who present for evaluation of elevated prolactin concern for hypogonadism \par \par \par # patient reports he was started on risperdal in 2019 when he was admitted to inpatient psych and has been on it since , he is now working with new psychiatrist to wean him off as he believes this medication made him worse .\par - he recently saw urology and was evaluated as he was having erectile dysfunction and unable to maintain erections, he has normal libido but loss of morning erections, denies taking any OTC or steroids or shots \par he has some decrease in testicular size and .\par labs showed elevated prolactin twice \par \par Prolactin \par 2/2023 - 75.5 ng/ml\par 12/2022 - 99.8 ng/ml\par \par FSH and LH - wnl\par LH - 6.4 IU/L\par FSH - 2.8 IU/L\par \par 12/2022\par SHBG - 24.7 nmoll.L\par Test / Free T - 82 / 20\par \par MRI brain - 8/2022\par 1.7 cm focus of FLAIR hyperintensity in the right periventricular \par finding compatible with demyelinating diseases \par \par to note that he was also diagnosed with possible MS but not following with neurology \par \par \par \par \par \par [id number]  Dr mccoy  ( psychiatry )\par \par

## 2023-04-20 NOTE — END OF VISIT
Need previsit labs-See pending orders and close encounter./Tash Bartholomew,         Chol check 5/2/17    
This patient has a lab only appointment on 4/19/2017 but does not have future orders. Please review, associate diagnosis and sign pending lab orders for the upcoming appointment.  She has an appointment with Dr. Cerda on 5/2/2017.    Thank you,    [facility]    
[Time Spent: ___ minutes] : I have spent [unfilled] minutes of time on the encounter.

## 2023-04-20 NOTE — PHYSICAL EXAM
[Alert] : alert [Obese] : obese [No Acute Distress] : no acute distress [Thyroid Not Enlarged] : the thyroid was not enlarged [No Respiratory Distress] : no respiratory distress [No Accessory Muscle Use] : no accessory muscle use [Clear to Auscultation] : lungs were clear to auscultation bilaterally [Normal S1, S2] : normal S1 and S2 [No Murmurs] : no murmurs [Regular Rhythm] : with a regular rhythm [No Edema] : no peripheral edema [Gynecomastia] : gynecomastia present [Not Tender] : non-tender [Not Distended] : not distended [Soft] : abdomen soft [No Stigmata of Cushings Syndrome] : no stigmata of Cushings Syndrome [Abdominal Striae] : no abdominal striae [Acanthosis Nigricans] : acanthosis nigricans present [Acne] : no acne [No Tremors] : no tremors [Oriented x3] : oriented to person, place, and time [de-identified] : left facial droop

## 2023-04-20 NOTE — REVIEW OF SYSTEMS
[Fatigue] : fatigue [Recent Weight Gain (___ Lbs)] : recent weight gain: [unfilled] lbs [Dysphagia] : no dysphagia [Dysphonia] : no dysphonia [Chest Pain] : no chest pain [Palpitations] : no palpitations [Fast Heart Rate] : heart rate is not fast [Shortness Of Breath] : no shortness of breath [SOB on Exertion] : no shortness of breath on exertion [Nausea] : no nausea [Constipation] : no constipation [Diarrhea] : no diarrhea [Vomiting] : no vomiting [Erectile Dysfunction] : erectile dysfunction [Incontinence] : no incontinence [Decreased Libido] : no decreased libido [Acanthosis] : acanthosis [Headaches] : no headaches [Dizziness] : no dizziness [Tremors] : no tremors [As Noted in HPI] : as noted in HPI [Cold Intolerance] : no cold intolerance [Heat Intolerance] : no heat intolerance [Galactorrhea] : no galactorrhea  [FreeTextEntry3] : has left [place]' palsy

## 2023-06-22 ENCOUNTER — NON-APPOINTMENT (OUTPATIENT)
Age: 38
End: 2023-06-22

## 2023-06-22 ENCOUNTER — OUTPATIENT (OUTPATIENT)
Dept: OUTPATIENT SERVICES | Facility: HOSPITAL | Age: 38
LOS: 1 days | End: 2023-06-22
Payer: MEDICAID

## 2023-06-22 ENCOUNTER — APPOINTMENT (OUTPATIENT)
Dept: UROLOGY | Facility: CLINIC | Age: 38
End: 2023-06-22
Payer: MEDICAID

## 2023-06-22 VITALS
DIASTOLIC BLOOD PRESSURE: 100 MMHG | HEIGHT: 73 IN | OXYGEN SATURATION: 97 % | WEIGHT: 277 LBS | BODY MASS INDEX: 36.71 KG/M2 | HEART RATE: 90 BPM | SYSTOLIC BLOOD PRESSURE: 152 MMHG

## 2023-06-22 DIAGNOSIS — Z00.00 ENCOUNTER FOR GENERAL ADULT MEDICAL EXAMINATION WITHOUT ABNORMAL FINDINGS: ICD-10-CM

## 2023-06-22 DIAGNOSIS — N50.0 ATROPHY OF TESTIS: ICD-10-CM

## 2023-06-22 LAB
ALBUMIN SERPL ELPH-MCNC: 4.4 G/DL
ALP BLD-CCNC: 154 U/L
ALT SERPL-CCNC: 15 U/L
AST SERPL-CCNC: 17 U/L
BILIRUB DIRECT SERPL-MCNC: <0.2 MG/DL
BILIRUB INDIRECT SERPL-MCNC: >0.3 MG/DL
BILIRUB SERPL-MCNC: 0.5 MG/DL
PROLACTIN SERPL-MCNC: 75.5 NG/ML
PROT SERPL-MCNC: 7.5 G/DL
SHBG SERPL-SCNC: 24.7 NMOL/L
TESTOSTERONE FREE/WEAKLY BND: 20.3 NG/DL
TESTOSTERONE TOTAL S: 82 NG/DL
TESTOSTERONE, % FREE/WEAKLY BND: 24.8 %

## 2023-06-22 PROCEDURE — 99213 OFFICE O/P EST LOW 20 MIN: CPT

## 2023-06-22 NOTE — ASSESSMENT
[FreeTextEntry1] : endo wants to wait until off risperidone for a month- wont happen until at least mid September as seeing psych early august\par until prolactin, settled cant really tell what is doing with testosterone\par still needs scrotal sono

## 2023-06-22 NOTE — PHYSICAL EXAM
[General Appearance - Well Developed] : well developed [General Appearance - Well Nourished] : well nourished [Normal Appearance] : normal appearance [Well Groomed] : well groomed [General Appearance - In No Acute Distress] : no acute distress [FreeTextEntry1] : BMI equals 36.55 [] : no respiratory distress [Respiration, Rhythm And Depth] : normal respiratory rhythm and effort [Exaggerated Use Of Accessory Muscles For Inspiration] : no accessory muscle use [Oriented To Time, Place, And Person] : oriented to person, place, and time [Affect] : the affect was normal [Mood] : the mood was normal [Not Anxious] : not anxious [Normal Station and Gait] : the gait and station were normal for the patient's age

## 2023-06-22 NOTE — LETTER HEADER
[FreeTextEntry3] : Jolly Hurtado M.D.\par Director Emeritus of Urology\par [facility]\par [address] [place] [address], [address]\[place] [address]
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
## 2023-06-26 DIAGNOSIS — R79.89 OTHER SPECIFIED ABNORMAL FINDINGS OF BLOOD CHEMISTRY: ICD-10-CM

## 2023-06-26 DIAGNOSIS — N52.9 MALE ERECTILE DYSFUNCTION, UNSPECIFIED: ICD-10-CM

## 2023-06-26 DIAGNOSIS — N50.0 ATROPHY OF TESTIS: ICD-10-CM

## 2023-08-03 ENCOUNTER — OUTPATIENT (OUTPATIENT)
Dept: OUTPATIENT SERVICES | Facility: HOSPITAL | Age: 38
LOS: 1 days | End: 2023-08-03
Payer: MEDICAID

## 2023-08-03 DIAGNOSIS — N50.0 ATROPHY OF TESTIS: ICD-10-CM

## 2023-08-03 PROCEDURE — 76870 US EXAM SCROTUM: CPT

## 2023-08-03 PROCEDURE — 76870 US EXAM SCROTUM: CPT | Mod: 26

## 2023-08-04 DIAGNOSIS — N50.0 ATROPHY OF TESTIS: ICD-10-CM

## 2023-08-16 ENCOUNTER — APPOINTMENT (OUTPATIENT)
Dept: ENDOCRINOLOGY | Facility: CLINIC | Age: 38
End: 2023-08-16
Payer: MEDICAID

## 2023-08-16 ENCOUNTER — OUTPATIENT (OUTPATIENT)
Dept: OUTPATIENT SERVICES | Facility: HOSPITAL | Age: 38
LOS: 1 days | End: 2023-08-16
Payer: MEDICAID

## 2023-08-16 VITALS
DIASTOLIC BLOOD PRESSURE: 110 MMHG | SYSTOLIC BLOOD PRESSURE: 159 MMHG | WEIGHT: 275 LBS | BODY MASS INDEX: 36.45 KG/M2 | HEIGHT: 73 IN | HEART RATE: 85 BPM

## 2023-08-16 DIAGNOSIS — Z00.00 ENCOUNTER FOR GENERAL ADULT MEDICAL EXAMINATION WITHOUT ABNORMAL FINDINGS: ICD-10-CM

## 2023-08-16 PROCEDURE — 99213 OFFICE O/P EST LOW 20 MIN: CPT

## 2023-08-17 NOTE — PHYSICAL EXAM
[Alert] : alert [Obese] : obese [No Acute Distress] : no acute distress [Thyroid Not Enlarged] : the thyroid was not enlarged [No Respiratory Distress] : no respiratory distress [No Accessory Muscle Use] : no accessory muscle use [Clear to Auscultation] : lungs were clear to auscultation bilaterally [Normal S1, S2] : normal S1 and S2 [No Murmurs] : no murmurs [Regular Rhythm] : with a regular rhythm [No Edema] : no peripheral edema [Gynecomastia] : gynecomastia present [Not Tender] : non-tender [Not Distended] : not distended [Soft] : abdomen soft [No Stigmata of Cushings Syndrome] : no stigmata of Cushings Syndrome [Abdominal Striae] : no abdominal striae [Acanthosis Nigricans] : acanthosis nigricans present [Acne] : no acne [No Tremors] : no tremors [Oriented x3] : oriented to person, place, and time [de-identified] : no change  [de-identified] : left facial droop  [de-identified] : per urology atrophic testicles

## 2023-08-17 NOTE — REVIEW OF SYSTEMS
[Fatigue] : fatigue [Recent Weight Gain (___ Lbs)] : recent weight gain: [unfilled] lbs [Dysphagia] : no dysphagia [Dysphonia] : no dysphonia [Chest Pain] : no chest pain [Palpitations] : no palpitations [Fast Heart Rate] : heart rate is not fast [Shortness Of Breath] : no shortness of breath [SOB on Exertion] : no shortness of breath on exertion [Nausea] : no nausea [Constipation] : no constipation [Vomiting] : no vomiting [Diarrhea] : no diarrhea [Erectile Dysfunction] : erectile dysfunction [Incontinence] : no incontinence [Decreased Libido] : no decreased libido [Acanthosis] : acanthosis [Headaches] : no headaches [Dizziness] : no dizziness [Tremors] : no tremors [As Noted in HPI] : as noted in HPI [Cold Intolerance] : no cold intolerance [Heat Intolerance] : no heat intolerance [Galactorrhea] : no galactorrhea  [All other systems negative] : All other systems negative [FreeTextEntry3] : has left [place]' palsy

## 2023-08-17 NOTE — HISTORY OF PRESENT ILLNESS
[FreeTextEntry1] : 38 year old male patient with history of Bell's palsy, HTN, ? Schizoaffective disorder who presents for follow up evaluation of elevated prolactin concern for hypogonadism.    # patient reports he was started on risperdal in 2019 when he was admitted to inpatient psych and has been on it since , he is now working with new psychiatrist to wean him off as he believes this medication made him worse . - he recently saw urology and was evaluated as he was having erectile dysfunction and unable to maintain erections, he has normal libido but loss of morning erections, denies taking any OTC or steroids or shots  he has some decrease in testicular size and . labs showed elevated prolactin twice   Prolactin  2/2023 - 75.5 ng/ml 12/2022 - 99.8 ng/ml  FSH and LH - wnl LH - 6.4 IU/L FSH - 2.8 IU/L  12/2022 SHBG - 24.7 nmoll.L Test / Free T - 82 / 20  MRI brain - 8/2022 1.7 cm focus of FLAIR hyperintensity in the right periventricular  finding compatible with demyelinating diseases   to note that he was also diagnosed with possible MS but not following with neurology    8/2023: patient present for follow up  has been off Risperdal since 7/4/23 and feels better, continue to have symptoms.    [phone]  Dr mccoy  ( psychiatry )

## 2023-08-17 NOTE — ASSESSMENT
[FreeTextEntry1] : 38 year old male patient with history of Bell's palsy , HTN , ? Schizoaffective disorder who presents for follow up  evaluation of elevated prolactin concern for hypogonadism    #elevated prolactin, low testosterone  -  patient reports he was started on risperdal in 2019 when he was admitted to inpatient psych and has been on it since , he is now working with new psychiatrist to wean him off as he believes this medication made him worse . - he recently saw urology and was evaluated as he was having erectile dysfunction and unable to maintain erections, he has normal libido but loss of morning erections, denies taking any OTC or steroids or shots  he has some decrease in testicular size and . labs showed elevated prolactin twice , testosterone low with inappropriate FSH , LH ? 8/2022 did not show pituitary pathology but not dedicated  8/2023 : patient has been off risperdal since 7/4/ 23, he will hold all supplements for 1 week then do labs based on results will decide if need to do MRI pituitary ? treatment  follow up with urology regarding atrophic testicles.

## 2023-08-18 DIAGNOSIS — E78.5 HYPERLIPIDEMIA, UNSPECIFIED: ICD-10-CM

## 2023-08-18 DIAGNOSIS — R79.89 OTHER SPECIFIED ABNORMAL FINDINGS OF BLOOD CHEMISTRY: ICD-10-CM

## 2023-08-18 DIAGNOSIS — E66.9 OBESITY, UNSPECIFIED: ICD-10-CM

## 2023-09-13 ENCOUNTER — APPOINTMENT (OUTPATIENT)
Dept: ENDOCRINOLOGY | Facility: CLINIC | Age: 38
End: 2023-09-13
Payer: MEDICAID

## 2023-09-13 ENCOUNTER — OUTPATIENT (OUTPATIENT)
Dept: OUTPATIENT SERVICES | Facility: HOSPITAL | Age: 38
LOS: 1 days | End: 2023-09-13
Payer: MEDICAID

## 2023-09-13 VITALS — HEIGHT: 73 IN | WEIGHT: 273 LBS | BODY MASS INDEX: 36.18 KG/M2

## 2023-09-13 DIAGNOSIS — Z00.00 ENCOUNTER FOR GENERAL ADULT MEDICAL EXAMINATION WITHOUT ABNORMAL FINDINGS: ICD-10-CM

## 2023-09-13 DIAGNOSIS — R79.89 OTHER SPECIFIED ABNORMAL FINDINGS OF BLOOD CHEMISTRY: ICD-10-CM

## 2023-09-13 DIAGNOSIS — E78.5 HYPERLIPIDEMIA, UNSPECIFIED: ICD-10-CM

## 2023-09-13 DIAGNOSIS — R73.03 PREDIABETES: ICD-10-CM

## 2023-09-13 PROCEDURE — 99211 OFF/OP EST MAY X REQ PHY/QHP: CPT

## 2023-09-13 PROCEDURE — 99214 OFFICE O/P EST MOD 30 MIN: CPT

## 2023-09-21 ENCOUNTER — APPOINTMENT (OUTPATIENT)
Dept: UROLOGY | Facility: CLINIC | Age: 38
End: 2023-09-21

## 2023-09-26 ENCOUNTER — NON-APPOINTMENT (OUTPATIENT)
Age: 38
End: 2023-09-26

## 2023-09-27 ENCOUNTER — EMERGENCY (EMERGENCY)
Facility: HOSPITAL | Age: 38
LOS: 0 days | Discharge: HOME | End: 2023-09-28
Attending: STUDENT IN AN ORGANIZED HEALTH CARE EDUCATION/TRAINING PROGRAM
Payer: MEDICAID

## 2023-09-27 VITALS
SYSTOLIC BLOOD PRESSURE: 187 MMHG | OXYGEN SATURATION: 97 % | WEIGHT: 270.07 LBS | TEMPERATURE: 99 F | DIASTOLIC BLOOD PRESSURE: 112 MMHG | RESPIRATION RATE: 18 BRPM | HEIGHT: 71 IN | HEART RATE: 102 BPM
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
## 2023-09-27 DIAGNOSIS — S62.337A DISPLACED FRACTURE OF NECK OF FIFTH METACARPAL BONE, LEFT HAND, INITIAL ENCOUNTER FOR CLOSED FRACTURE: ICD-10-CM

## 2023-09-27 DIAGNOSIS — M79.642 PAIN IN LEFT HAND: ICD-10-CM

## 2023-09-27 DIAGNOSIS — H57.12 OCULAR PAIN, LEFT EYE: ICD-10-CM

## 2023-09-27 DIAGNOSIS — S05.02XA INJURY OF CONJUNCTIVA AND CORNEAL ABRASION WITHOUT FOREIGN BODY, LEFT EYE, INITIAL ENCOUNTER: ICD-10-CM
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
## 2023-09-27 PROCEDURE — 73130 X-RAY EXAM OF HAND: CPT | Mod: 26,50

## 2023-09-27 PROCEDURE — 99284 EMERGENCY DEPT VISIT MOD MDM: CPT

## 2023-09-27 PROCEDURE — 73130 X-RAY EXAM OF HAND: CPT | Mod: 50

## 2023-09-27 PROCEDURE — 99283 EMERGENCY DEPT VISIT LOW MDM: CPT | Mod: 25

## 2023-09-27 RX ORDER — ACETAMINOPHEN 500 MG
650 TABLET ORAL ONCE
Refills: 0 | Status: COMPLETED | OUTPATIENT
Start: 2023-09-27 | End: 2023-09-27

## 2023-09-27 RX ORDER — POLYMYXIN B SULF/TRIMETHOPRIM 10000-1/ML
1 DROPS OPHTHALMIC (EYE) EVERY 4 HOURS
Refills: 0 | Status: DISCONTINUED | OUTPATIENT
Start: 2023-09-27 | End: 2023-09-28

## 2023-09-27 RX ADMIN — Medication 650 MILLIGRAM(S): at 22:59

## 2023-09-27 RX ADMIN — Medication 1 DROP(S): at 22:59

## 2023-09-27 NOTE — ED ADULT NURSE NOTE - NSFALLUNIVINTERV_ED_ALL_ED
Bed/Stretcher in lowest position, wheels locked, appropriate side rails in place/Call bell, personal items and telephone in reach/Instruct patient to call for assistance before getting out of bed/chair/stretcher/Non-slip footwear applied when patient is off stretcher/[place] to call system/Physically safe environment - no spills, clutter or unnecessary equipment/Purposeful proactive rounding/Room/bathroom lighting operational, light cord in reach
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
## 2023-09-27 NOTE — ED ADULT TRIAGE NOTE - TEMPERATURE IN CELSIUS (DEGREES C)
Jl states he was told to take GasX in the  yesterday. The box states no more than 4 a day. He is asking if he can take more than that? They did x-rays in the Urgent Care. Please call him back.     Thank you   37.2

## 2023-09-28 LAB
ESTRADIOL SERPL-MCNC: 57 PG/ML
FSH SERPL-MCNC: 4 IU/L
LH SERPL-ACNC: 7.2 IU/L
MONOMERIC PROLACTIN (ICMA)*: 14.6 NG/ML
PERCENT MACROPROLACTIN: 17 %
PROLACTIN SERPL-MCNC: 16.3 NG/ML
PROLACTIN SERPL-MCNC: 16.4 NG/ML
PROLACTIN, SERUM (ICMA)*: 17.6 NG/ML
PSA FREE FLD-MCNC: 35 %
PSA FREE SERPL-MCNC: 0.35 NG/ML
PSA SERPL-MCNC: 1 NG/ML
SHBG SERPL-SCNC: 29.2 NMOL/L
T4 FREE SERPL-MCNC: 1.5 NG/DL
TESTOST FREE SERPL-MCNC: 11.8 PG/ML
TESTOST SERPL-MCNC: 501 NG/DL
TESTOSTERONE FREE/WEAKLY BND: 153.8 NG/DL
TESTOSTERONE TOTAL S: 451 NG/DL
TESTOSTERONE, % FREE/WEAKLY BND: 34.1 %
TSH SERPL-ACNC: 1.18 UIU/ML

## 2023-09-28 NOTE — ED PROVIDER NOTE - PATIENT PORTAL LINK FT
You can access the FollowMyHealth Patient Portal offered by [facility] by registering at the following website: [email or web address]. By joining [employer]’s FollowMyHealth portal, you will also be able to view your health information using other applications (apps) compatible with our system.
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
## 2023-09-28 NOTE — ED PROVIDER NOTE - NSFOLLOWUPINSTRUCTIONS_ED_ALL_ED_FT
Fracture    A fracture is a break in one of your bones. This can occur from a variety of injuries, especially traumatic ones. Symptoms include pain, bruising, or swelling. Do not use the injured limb. If a fracture is in one of the bones below your waist, do not put weight on that limb unless instructed to do so by your healthcare provider. Crutches or a cane may have been provided. A splint or cast may have been applied by your health care provider. Make sure to keep it dry and follow up with an orthopedist as instructed.    SEEK IMMEDIATE MEDICAL CARE IF YOU HAVE ANY OF THE FOLLOWING SYMPTOMS: numbness, tingling, increasing pain, or weakness in any part of the injured limb.    Corneal Ulcer  The cornea is the clear covering at the front and center of the eye. The cornea protects the eye and helps to focus vision. A corneal ulcer is an open sore on the cornea. Corneal ulcers can lead to scarring. A scarred cornea can affect vision. Corneal ulcers may cause permanent damage if they are not treated.    What are the causes?  The most common causes of this condition is an infection. The infection may be caused by:  Bacteria.  Viruses.  Fungi.  Parasites.  Factors that can lead to this condition include:  A foreign body in the eye, such as sand, glass, or small pieces of metal.  Dry eye syndrome.  Certain conditions that prevent the eyelids from closing completely, such as Bell's palsy.  An eye injury.  Contact lenses.  What increases the risk?  The following factors may make you more likely to develop this condition:  Wearing your contact lenses for too long, wearing them overnight, or not taking care of them properly.  Having a weakened disease-fighting (immune) system.  Having a history of cold sores, chicken pox, or shingles around the eye.  Using steroid eye drops.  What are the signs or symptoms?    Symptoms of this condition include:  Eye pain. The pain is often severe.  Blurry vision and sensitivity to light.  Pus or thick discharge coming from your eye.  Eye redness.  Feeling like something is in your eye, or having a watery eye.  A burning or stinging feeling.  When ulcers get large, they may be seen as a white spot on the cornea.    How is this diagnosed?  This condition is diagnosed based on your symptoms and medical history as well as an eye exam. Your health care provider may use a type of microscope (slit lamp) to examine your cornea. Eye drops may be put into your eye to make the ulcer easier to see.    Tissue samples or cultures from the eye may be done to see if an infection is causing the corneal ulcer. Numbing eye drops will be given before any samples or cultures are taken. The samples or cultures will be checked in the lab for organisms such as bacteria, viruses, fungi, or parasites.    How is this treated?  Treatment for this condition depends on the cause. You may be given antibiotic eye drops until your health care provider knows the test results. At first, the antibiotic eye drops may be given very frequently, sometimes even around the clock. Other treatments may include:  Antibiotic medicines by mouth, or medicines in the form of ointments or eye drops, to treat infections caused by bacteria, viruses, fungi or parasites.  Over-the-counter or prescription pain medicine.  Steroid eye drops if the eye is inflamed and swollen.  An injection of medicine under the thin membrane covering the eyeball (conjunctiva). This allows medicine to reach the ulcer in high doses.  Removal of the foreign body that caused the eye injury.  Artificial tears or a bandage contact lens if severe dry eyes caused the corneal ulcer.  Wearing an eye patch, if told by your elias care provider.  If the corneal ulcer causes a scar on the cornea that interferes with vision, surgery may be needed to replace the cornea (corneal transplant) long after the infection has resolved.    Follow these instructions at home:  Medicines    Take or apply any over-the-counter and prescription medicines only as told by your health care provider.  If you were prescribed an antibiotic medicine, including pills, eye drops, or ointment, use it as told by your health care provider. Do not stop using the antibiotic even if you start to feel better.  You may have to apply eye drops every few minutes to every hour for several days.  It may be necessary to set an alarm every few minutes during the day to every hour during the night so that you can apply eye drops.  Use artificial tears as needed if you have dry eyes.  Ask your health care provider if the medicine prescribed to you:  Requires you to avoid driving or using heavy machinery.  Can cause constipation. You may need to take these actions to prevent or treat constipation:  Drink enough fluid to keep your urine pale yellow.  Take over-the-counter or prescription medicine.  Eat foods that are high in fiber, such as beans, whole grains, and fresh fruits and vegetables.  Limit foods that are high in fat and processed sugars, such as fried or sweet foods.  Lifestyle    Avoid wearing eye makeup.  Avoid bright lights. Use sunglasses if you have light sensitivity.  Do not wear contact lenses until your health care provider approves.  Do not drive or operate heavy machinery until your health care provider approves. Do not drive or use machinery while wearing an eye patch. Your ability to  distances will be impaired.  General instructions    Do not touch or rub your eye. This may increase the irritation and spread the infection.  If directed, wear your eye patch as told.  Apply cool packs to your eye to relieve discomfort and swelling.  If you have an infection, wash your hands often with soap and water. If soap and water are not available, use hand .  Keep all follow-up visits as told by your health care provider. This is important.  How is this prevented?  If you normally wear contact lenses:  Do not wear contact lenses while you sleep.  Do not swim while wearing contact lenses.  Wash your hands before removing your contact lenses.  Properly sterilize and store your contact lenses.  Regularly clean your contact lens case.  Do not use your saliva or tap water to clean or wet your contact lenses.  Remove your contact lenses if your eyes become irritated. You may put them back in once your eyes feel better.  Where to find more information  American Academy of Ophthalmology: www.aao.org  Contact a health care provider if:  Your pain gets worse.  You have more discharge coming from your eye.  Get help right away if:  You have a change in vision.  Your eyelids or the skin around them develops worse redness or swelling.  Summary  A corneal ulcer is an open sore on the cornea that can cause corneal scarring and vision problems.  Severe eye pain is a common symptom of a corneal ulcer.  Treatment for a corneal ulcer depends on the cause. It may include pain medicines and antibiotic medicines by mouth, or medicines in the form of ointments or eye drops, to treat infections caused by bacteria, viruses, fungi, or parasites.  This information is not intended to replace advice given to you by your health care provider. Make sure you discuss any questions you have with your health care provider.

## 2023-10-04 ENCOUNTER — APPOINTMENT (OUTPATIENT)
Dept: ENDOCRINOLOGY | Facility: CLINIC | Age: 38
End: 2023-10-04
Payer: MEDICAID

## 2023-10-04 ENCOUNTER — OUTPATIENT (OUTPATIENT)
Dept: OUTPATIENT SERVICES | Facility: HOSPITAL | Age: 38
LOS: 1 days | End: 2023-10-04
Payer: MEDICAID

## 2023-10-04 VITALS
SYSTOLIC BLOOD PRESSURE: 149 MMHG | HEART RATE: 102 BPM | DIASTOLIC BLOOD PRESSURE: 102 MMHG | BODY MASS INDEX: 35.23 KG/M2 | WEIGHT: 267 LBS

## 2023-10-04 DIAGNOSIS — R79.89 OTHER SPECIFIED ABNORMAL FINDINGS OF BLOOD CHEMISTRY: ICD-10-CM

## 2023-10-04 DIAGNOSIS — Z00.00 ENCOUNTER FOR GENERAL ADULT MEDICAL EXAMINATION WITHOUT ABNORMAL FINDINGS: ICD-10-CM

## 2023-10-04 DIAGNOSIS — E66.9 OBESITY, UNSPECIFIED: ICD-10-CM

## 2023-10-04 PROCEDURE — 99213 OFFICE O/P EST LOW 20 MIN: CPT

## 2023-10-06 DIAGNOSIS — R79.89 OTHER SPECIFIED ABNORMAL FINDINGS OF BLOOD CHEMISTRY: ICD-10-CM

## 2023-10-06 DIAGNOSIS — E66.9 OBESITY, UNSPECIFIED: ICD-10-CM

## 2023-11-22 ENCOUNTER — OUTPATIENT (OUTPATIENT)
Dept: OUTPATIENT SERVICES | Facility: HOSPITAL | Age: 38
LOS: 1 days | End: 2023-11-22
Payer: MEDICAID

## 2023-11-22 ENCOUNTER — APPOINTMENT (OUTPATIENT)
Dept: ENDOCRINOLOGY | Facility: CLINIC | Age: 38
End: 2023-11-22

## 2023-11-22 VITALS
BODY MASS INDEX: 34.19 KG/M2 | WEIGHT: 258 LBS | DIASTOLIC BLOOD PRESSURE: 102 MMHG | HEART RATE: 104 BPM | SYSTOLIC BLOOD PRESSURE: 154 MMHG | HEIGHT: 73 IN

## 2023-11-22 DIAGNOSIS — R73.03 PREDIABETES.: ICD-10-CM

## 2023-11-22 DIAGNOSIS — N52.9 MALE ERECTILE DYSFUNCTION, UNSPECIFIED: ICD-10-CM

## 2023-11-22 DIAGNOSIS — Z00.00 ENCOUNTER FOR GENERAL ADULT MEDICAL EXAMINATION WITHOUT ABNORMAL FINDINGS: ICD-10-CM

## 2023-11-22 DIAGNOSIS — R79.89 OTHER SPECIFIED ABNORMAL FINDINGS OF BLOOD CHEMISTRY: ICD-10-CM

## 2023-11-22 PROCEDURE — 99213 OFFICE O/P EST LOW 20 MIN: CPT

## 2023-11-29 DIAGNOSIS — E29.1 TESTICULAR HYPOFUNCTION: ICD-10-CM

## 2023-12-29 ENCOUNTER — EMERGENCY (EMERGENCY)
Facility: HOSPITAL | Age: 38
LOS: 0 days | Discharge: HOME | End: 2023-12-29
Attending: EMERGENCY MEDICINE
Payer: MEDICAID

## 2023-12-29 VITALS
SYSTOLIC BLOOD PRESSURE: 175 MMHG | HEART RATE: 95 BPM | DIASTOLIC BLOOD PRESSURE: 97 MMHG | HEIGHT: 71 IN | OXYGEN SATURATION: 98 % | RESPIRATION RATE: 18 BRPM | TEMPERATURE: 97 F | WEIGHT: 240.08 LBS

## 2023-12-29 DIAGNOSIS — S00.83XA CONTUSION OF OTHER PART OF HEAD, INITIAL ENCOUNTER: ICD-10-CM
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
## 2023-12-29 DIAGNOSIS — S09.90XA UNSPECIFIED INJURY OF HEAD, INITIAL ENCOUNTER: ICD-10-CM
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
## 2023-12-29 DIAGNOSIS — S00.03XA CONTUSION OF SCALP, INITIAL ENCOUNTER: ICD-10-CM

## 2023-12-29 PROCEDURE — 99283 EMERGENCY DEPT VISIT LOW MDM: CPT

## 2023-12-29 PROCEDURE — 99284 EMERGENCY DEPT VISIT MOD MDM: CPT

## 2023-12-29 RX ORDER — ACETAMINOPHEN 500 MG
650 TABLET ORAL ONCE
Refills: 0 | Status: COMPLETED | OUTPATIENT
Start: 2023-12-29 | End: 2023-12-29

## 2023-12-29 RX ADMIN — Medication 650 MILLIGRAM(S): at 21:57

## 2023-12-29 NOTE — ED ADULT TRIAGE NOTE - WEIGHT IN LBS
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 Denies fever and chills. No post operative issues.\par \par BBI lifetime risk is 16.8%, discussed breast cancer risk.\par \par 
240
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
## 2023-12-29 NOTE — ED PROVIDER NOTE - PATIENT PORTAL LINK FT
You can access the FollowMyHealth Patient Portal offered by [facility] by registering at the following website: [email or web address]. By joining [employer]’s FollowMyHealth portal, you will also be able to view your health information using other applications (apps) compatible with our system. You can access the FollowMyHealth Patient Portal offered by [facility] by registering at the following website: [email or web address]. By joining [employer]’s FollowMyHealth portal, you will also be able to view your health information using other applications (apps) compatible with our system.
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
## 2023-12-29 NOTE — ED ADULT NURSE NOTE - NSFALLUNIVINTERV_ED_ALL_ED
Bed/Stretcher in lowest position, wheels locked, appropriate side rails in place/Call bell, personal items and telephone in reach/Instruct patient to call for assistance before getting out of bed/chair/stretcher/Non-slip footwear applied when patient is off stretcher/[place] to call system/Physically safe environment - no spills, clutter or unnecessary equipment/Purposeful proactive rounding/Room/bathroom lighting operational, light cord in reach Bed/Stretcher in lowest position, wheels locked, appropriate side rails in place/Call bell, personal items and telephone in reach/Instruct patient to call for assistance before getting out of bed/chair/stretcher/Non-slip footwear applied when patient is off stretcher/[place] to call system/Physically safe environment - no spills, clutter or unnecessary equipment/Purposeful proactive rounding/Room/bathroom lighting operational, light cord in reach

## 2023-12-29 NOTE — ED PROVIDER NOTE - PHYSICAL EXAMINATION
CONST: well appearing for age, NAD  HEAD: + small right frontal hematoma  EYES:  conjunctivae without injection, drainage or discharge  ENMT:  tympanic membranes pearly gray with normal landmarks; nasal mucosa moist; mouth moist without ulcerations or lesions; throat moist without erythema, exudate, ulcerations or lesions  NECK:  supple, no midline tenderness, full ROM  CARDIAC:  regular rate and rhythm, normal S1 and S2, no murmurs, rubs or gallops  RESP:  respiratory rate and effort appear normal for age; lungs are clear to auscultation bilaterally; no rales or wheezes  ABDOMEN:  soft, nontender, nondistended  MUSCULOSKELETAL/NEURO:  awake and alert, CN II-XII grossly intact, sensation intact throughout, ambulates well without assistance, normal movement, normal tone  SKIN:  normal skin color for age and race, well-perfused; warm and dry
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
## 2024-01-02 ENCOUNTER — OUTPATIENT (OUTPATIENT)
Dept: OUTPATIENT SERVICES | Facility: HOSPITAL | Age: 39
LOS: 1 days | End: 2024-01-02
Payer: MEDICAID

## 2024-01-02 ENCOUNTER — APPOINTMENT (OUTPATIENT)
Dept: NEUROLOGY | Facility: CLINIC | Age: 39
End: 2024-01-02
Payer: MEDICAID

## 2024-01-02 VITALS — SYSTOLIC BLOOD PRESSURE: 143 MMHG | OXYGEN SATURATION: 99 % | DIASTOLIC BLOOD PRESSURE: 81 MMHG | HEART RATE: 81 BPM

## 2024-01-02 DIAGNOSIS — R93.0 ABNORMAL FINDINGS ON DIAGNOSTIC IMAGING OF SKULL AND HEAD, NOT ELSEWHERE CLASSIFIED: ICD-10-CM

## 2024-01-02 DIAGNOSIS — G37.9 DEMYELINATING DISEASE OF CENTRAL NERVOUS SYSTEM, UNSPECIFIED: ICD-10-CM

## 2024-01-02 DIAGNOSIS — Z00.00 ENCOUNTER FOR GENERAL ADULT MEDICAL EXAMINATION WITHOUT ABNORMAL FINDINGS: ICD-10-CM

## 2024-01-02 PROCEDURE — 99214 OFFICE O/P EST MOD 30 MIN: CPT

## 2024-01-02 RX ORDER — RISPERIDONE 1 MG/1
1 TABLET, FILM COATED ORAL
Qty: 1 | Refills: 0 | Status: DISCONTINUED | COMMUNITY
Start: 2020-11-19 | End: 2024-01-02

## 2024-01-02 NOTE — HISTORY OF PRESENT ILLNESS
[FreeTextEntry1] : 35 year old male with history of schizoaffective disorder who currently resides at [facility] and [place] [facility] 15 years ago is here follow up. He was first seen in March, 2022 for evaluation, outside records revealed questionable Dx of MS? vs other demyelinating disease: MR showing demyelinating lesion, CSF: no OGB/IgG index.  he was seen in August and supposed to have repeat MRI and labs done not done.  He has been doing well since last visit. Denies any blurry vision, weakness or numbness. Denies any trouble with walking, ambulate independently. Reports intermittent spasm due to residual Bell's palsy.

## 2024-01-02 NOTE — ASSESSMENT
[FreeTextEntry1] : 37 years old male is here for follow up on previous H/O demyelinating disease. His last MRI of brain 08/02/22: concerning for demyelinating disease but no active enhancing lesion and its not fully consistent with MS, CSF study from 2019: no OGB and normal IgG index. No lab work up for MBP, MOG or NMO.. Patient never had any flare up symptoms, still asymptomatic. Neuro exam: unremarkable except the chronic facial nerve palsy.  Plan: - Will repeat MRI of brain, C-spine to assess the progression of lesions.  - RTC in 6 months following the MRIs.

## 2024-01-02 NOTE — END OF VISIT
[] : Resident [FreeTextEntry3] : 38-year-old man with history of Bell's palsy 15 years ago found on MRI to have an isolated periventricular lesion with associated atrophy.  Apparently underwent a lumbar puncture that did not demonstrate oligoclonal bands.  Plan is to repeat imaging to confirm stability.  At this time he has radiologically isolated syndrome with no prior episodes concerning for an acute demyelinating syndrome, and no dissemination in time or space. Unclear etiology of lesion. Could be vascular in etiology, or asymptomatic monophasic demyelinating inflammatory lesion.

## 2024-01-02 NOTE — PHYSICAL EXAM
[FreeTextEntry1] : Cranial Nerves: visual acuity intact bilaterally, visual fields full to confrontation, pupils equal round and reactive to light, extraocular motion intact, .   Motor: muscle tone was normal in all four extremities, muscle strength was normal in all four extremities and no involuntary movements were seen.   Motor Strength:. the patient is right hand dominant.   Sensory exam: light touch was intact.   Coordination:. normal gait. Finger to nose dysmetria was not present.

## 2024-01-11 DIAGNOSIS — G37.9 DEMYELINATING DISEASE OF CENTRAL NERVOUS SYSTEM, UNSPECIFIED: ICD-10-CM

## 2024-01-11 DIAGNOSIS — R93.0 ABNORMAL FINDINGS ON DIAGNOSTIC IMAGING OF SKULL AND HEAD, NOT ELSEWHERE CLASSIFIED: ICD-10-CM

## 2024-01-13 ENCOUNTER — EMERGENCY (EMERGENCY)
Facility: HOSPITAL | Age: 39
LOS: 0 days | Discharge: HOME | End: 2024-01-13
Attending: EMERGENCY MEDICINE
Payer: MEDICAID

## 2024-01-13 VITALS
TEMPERATURE: 98 F | WEIGHT: 199.96 LBS | HEART RATE: 94 BPM | SYSTOLIC BLOOD PRESSURE: 172 MMHG | HEIGHT: 71 IN | OXYGEN SATURATION: 99 % | DIASTOLIC BLOOD PRESSURE: 115 MMHG | RESPIRATION RATE: 18 BRPM

## 2024-01-13 VITALS
HEART RATE: 84 BPM | OXYGEN SATURATION: 99 % | SYSTOLIC BLOOD PRESSURE: 177 MMHG | DIASTOLIC BLOOD PRESSURE: 102 MMHG | RESPIRATION RATE: 18 BRPM
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
## 2024-01-13 DIAGNOSIS — I10 ESSENTIAL (PRIMARY) HYPERTENSION: ICD-10-CM

## 2024-01-13 DIAGNOSIS — S00.31XA ABRASION OF NOSE, INITIAL ENCOUNTER: ICD-10-CM

## 2024-01-13 PROCEDURE — 99282 EMERGENCY DEPT VISIT SF MDM: CPT

## 2024-01-13 PROCEDURE — 99283 EMERGENCY DEPT VISIT LOW MDM: CPT

## 2024-01-13 NOTE — ED ADULT NURSE NOTE - OBJECTIVE STATEMENT
38 yr old male complaining of pain on his nose after using a lotion. Pt also has not taken his BP meds in weeks. BP retaken here that read 177/102.

## 2024-01-13 NOTE — ED PROVIDER NOTE - OBJECTIVE STATEMENT
38-year-old male with past medical history of hypertension and Bell's palsy presenting with abrasion to the bridge of his nose.  Patient reports he was using face wash yesterday and he noticed a burning sensation on his nasal bridge.  No difficulty breathing, chest pain, shortness of breath, headache, dizziness, lightheadedness, blurry/double vision.  Patient denies remembering rashes.  Patient reports he has not taken his blood pressure medication in the past 3 weeks since his primary physician passed away.  Patient has follow-up on 1/16.  Patient denies any blurry/double vision.

## 2024-01-13 NOTE — ED PROVIDER NOTE - PHYSICAL EXAMINATION
Constitutional: Well developed, well nourished, no acute distress  Head: Normocephalic, Atraumatic  Eyes: PERRLA, EOMI, conjunctiva and sclera WNL  ENT: Moist mucous membranes, no rhinorrhea, 3cm abrasion to nasal bridge   Neck: Supple, Nontender,   Respiratory: Normal chest excursion with respiration; Breath sounds clear and equal B/L; No wheezes, rales, or rhonchi   Cardiovascular: RRR; Normal S1, S2; No murmurs, rubs or gallops   ABD/GI:  ; Nondistended; Nontender; No guarding, rigidity or rebound   EXT/MS: Moving all extremities; Distal pulses 2+ B/L   Neurologic: AAO x 4;   Normal motor and sensory function  Psychiatric: Appropriate affect, normal mood
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
## 2024-01-13 NOTE — ED PROVIDER NOTE - ATTENDING CONTRIBUTION TO CARE
38-year-old male PMH as noted presenting for evaluation of abrasion laboratories noticed after using a new face scrub.  Denies any associated complaints.  There is a superficial abrasion on exam, no surrounding, cellulitis, no additional abnormal findings.  Wound care was discussed with the patient, anticipatory guidance provided, advised to apply bacitracin ointment to the affected area, packets of bacitracin were given to the patient.  He was given opportunity ask questions, verbalized understanding and is amenable to discharge plan.  His blood pressure was elevated in ED, he has an appointment with his PCP on 1/16/2024.

## 2024-01-13 NOTE — ED ADULT NURSE NOTE - NSFALLUNIVINTERV_ED_ALL_ED
Bed/Stretcher in lowest position, wheels locked, appropriate side rails in place/Call bell, personal items and telephone in reach/Instruct patient to call for assistance before getting out of bed/chair/stretcher/Non-slip footwear applied when patient is off stretcher/[place] to call system/Physically safe environment - no spills, clutter or unnecessary equipment/Purposeful proactive rounding/Room/bathroom lighting operational, light cord in reach Bed/Stretcher in lowest position, wheels locked, appropriate side rails in place/Call bell, personal items and telephone in reach/Instruct patient to call for assistance before getting out of bed/chair/stretcher/Non-slip footwear applied when patient is off stretcher/[place] to call system/Physically safe environment - no spills, clutter or unnecessary equipment/Purposeful proactive rounding/Room/bathroom lighting operational, light cord in reach

## 2024-01-13 NOTE — ED PROVIDER NOTE - CLINICAL SUMMARY MEDICAL DECISION MAKING FREE TEXT BOX
38-year-old male PMH as noted presenting for evaluation of abrasion laboratories noticed after using a new face scrub.  Denies any associated complaints.  There is a superficial abrasion on exam, no surrounding, cellulitis, no additional abnormal findings.  Wound care was discussed with the patient, anticipatory guidance provided, advised to apply bacitracin ointment to the affected area, packets of bacitracin were given to the patient.  He was given opportunity ask questions, verbalized understanding and is amenable to discharge plan.  His blood pressure was elevated in ED, he has an appointment with his PCP on 1/16/2024. Prior charts/visits were reviewed.

## 2024-01-13 NOTE — ED PROVIDER NOTE - PATIENT PORTAL LINK FT
You can access the FollowMyHealth Patient Portal offered by [facility] by registering at the following website: [email or web address]. By joining [employer]’s FollowMyHealth portal, you will also be able to view your health information using other applications (apps) compatible with our system. You can access the FollowMyHealth Patient Portal offered by [facility] by registering at the following website: [email or web address]. By joining [employer]’s FollowMyHealth portal, you will also be able to view your health information using other applications (apps) compatible with our system.

## 2024-02-10 ENCOUNTER — EMERGENCY (EMERGENCY)
Facility: HOSPITAL | Age: 39
LOS: 0 days | Discharge: HOME | End: 2024-02-10
Attending: EMERGENCY MEDICINE
Payer: MEDICAID

## 2024-02-10 VITALS
HEIGHT: 71 IN | RESPIRATION RATE: 18 BRPM | SYSTOLIC BLOOD PRESSURE: 195 MMHG | DIASTOLIC BLOOD PRESSURE: 93 MMHG | HEART RATE: 75 BPM | TEMPERATURE: 99 F

## 2024-02-10 DIAGNOSIS — I10 ESSENTIAL (PRIMARY) HYPERTENSION: ICD-10-CM

## 2024-02-10 DIAGNOSIS — S60.415A ABRASION OF LEFT RING FINGER, INITIAL ENCOUNTER: ICD-10-CM

## 2024-02-10 DIAGNOSIS — E78.5 HYPERLIPIDEMIA, UNSPECIFIED: ICD-10-CM
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
## 2024-02-10 DIAGNOSIS — M79.89 OTHER SPECIFIED SOFT TISSUE DISORDERS: ICD-10-CM

## 2024-02-10 DIAGNOSIS — S61.412A LACERATION WITHOUT FOREIGN BODY OF LEFT HAND, INITIAL ENCOUNTER: ICD-10-CM
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
## 2024-02-10 PROCEDURE — L9981: CPT

## 2024-02-10 PROCEDURE — 99283 EMERGENCY DEPT VISIT LOW MDM: CPT | Mod: 25

## 2024-02-10 PROCEDURE — 73130 X-RAY EXAM OF HAND: CPT | Mod: 26,LT

## 2024-02-10 PROCEDURE — 73130 X-RAY EXAM OF HAND: CPT | Mod: LT

## 2024-02-10 PROCEDURE — 99284 EMERGENCY DEPT VISIT MOD MDM: CPT

## 2024-02-10 RX ORDER — METRONIDAZOLE 500 MG
1 TABLET ORAL
Qty: 21 | Refills: 0
Start: 2024-02-10 | End: 2024-02-16

## 2024-02-10 RX ORDER — CIPROFLOXACIN LACTATE 400MG/40ML
1 VIAL (ML) INTRAVENOUS
Qty: 14 | Refills: 0
Start: 2024-02-10 | End: 2024-02-16
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
## 2024-02-10 NOTE — ED PROVIDER NOTE - PHYSICAL EXAMINATION
VITAL SIGNS: I have reviewed nursing notes and confirm.  CONSTITUTIONAL: well-appearing, non-toxic, NAD  SKIN: Warm dry, normal skin turgor, small old laceration on dorsum of left fourth digit, slight edema present, TTP  HEAD: NCAT  EYES: EOMI, PERRLA, no scleral icterus  ENT: Moist mucous membranes, normal pharynx with no erythema or exudates  NECK: Supple; non tender. Full ROM. No cervical LAD  CARD: RRR, no murmurs, rubs or gallops  RESP: clear to ausculation b/l.  No rales, rhonchi, or wheezing.  ABD: soft, + BS, non-tender, non-distended, no rebound or guarding. No CVA tenderness  EXT: Full ROM, no bony tenderness, no pedal edema, pulses intact bilateral UE and LE. no calf tenderness  NEURO: normal motor. normal sensory. CN II-XII intact. Cerebellar testing normal. Normal gait.  PSYCH: Cooperative, appropriate.
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
## 2024-02-10 NOTE — ED ADULT NURSE NOTE - CINV DISCH TEACH PARTICIP

## 2024-02-10 NOTE — ED ADULT NURSE NOTE - PAIN: PRESENCE, MLM
Addended by: ROLO BENDER on: 12/8/2019 03:41 PM     Modules accepted: Level of Service    
complains of pain/discomfort

## 2024-02-10 NOTE — ED PROVIDER NOTE - PATIENT PORTAL LINK FT
You can access the FollowMyHealth Patient Portal offered by [facility] by registering at the following website: [email or web address]. By joining [employer]’s FollowMyHealth portal, you will also be able to view your health information using other applications (apps) compatible with our system.
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
## 2024-02-10 NOTE — ED PROVIDER NOTE - CARE PROVIDER_API CALL
Balbir Wilson  Orthopaedic Surgery  [address] [place] [address]  Phone: [phone]  Fax: [phone]  Follow Up Time: Routine

## 2024-02-19 ENCOUNTER — EMERGENCY (EMERGENCY)
Facility: HOSPITAL | Age: 39
LOS: 0 days | Discharge: HOME | End: 2024-02-19
Attending: EMERGENCY MEDICINE
Payer: MEDICAID

## 2024-02-19 VITALS
DIASTOLIC BLOOD PRESSURE: 100 MMHG | WEIGHT: 199.96 LBS | TEMPERATURE: 98 F | HEIGHT: 71 IN | SYSTOLIC BLOOD PRESSURE: 149 MMHG | HEART RATE: 80 BPM | RESPIRATION RATE: 18 BRPM

## 2024-02-19 DIAGNOSIS — M25.512 PAIN IN LEFT SHOULDER: ICD-10-CM

## 2024-02-19 DIAGNOSIS — S60.511A ABRASION OF RIGHT HAND, INITIAL ENCOUNTER: ICD-10-CM

## 2024-02-19 DIAGNOSIS — R07.81 PLEURODYNIA: ICD-10-CM

## 2024-02-19 DIAGNOSIS — F17.200 NICOTINE DEPENDENCE, UNSPECIFIED, UNCOMPLICATED: ICD-10-CM

## 2024-02-19 DIAGNOSIS — I10 ESSENTIAL (PRIMARY) HYPERTENSION: ICD-10-CM
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
## 2024-02-19 DIAGNOSIS — M25.552 PAIN IN LEFT HIP: ICD-10-CM

## 2024-02-19 PROCEDURE — 99284 EMERGENCY DEPT VISIT MOD MDM: CPT

## 2024-02-19 PROCEDURE — 99283 EMERGENCY DEPT VISIT LOW MDM: CPT | Mod: 25

## 2024-02-19 PROCEDURE — 73030 X-RAY EXAM OF SHOULDER: CPT | Mod: LT

## 2024-02-19 PROCEDURE — 73030 X-RAY EXAM OF SHOULDER: CPT | Mod: 26,LT

## 2024-02-19 RX ORDER — IBUPROFEN 200 MG
600 TABLET ORAL ONCE
Refills: 0 | Status: COMPLETED | OUTPATIENT
Start: 2024-02-19 | End: 2024-02-19

## 2024-02-19 RX ADMIN — Medication 600 MILLIGRAM(S): at 07:46
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
## 2024-02-19 NOTE — ED PROVIDER NOTE - PHYSICAL EXAMINATION
VITAL SIGNS: I have reviewed nursing notes and confirm.  CONSTITUTIONAL: well-appearing, non-toxic, NAD  SKIN: Warm dry, normal skin turgor. (+) abrasion right dorsal hand at the base of the 2nd digit  HEAD: NCAT  EYES: EOMI, PERRLA, no scleral icterus  ENT: Moist mucous membranes, normal pharynx with no erythema or exudates  NECK: Supple; non tender. Full ROM. No cervical LAD  CARD: RRR, no murmurs, rubs or gallops  RESP: clear to ausculation b/l.  No rales, rhonchi, or wheezing.  ABD: soft, + BS, non-tender, non-distended, no rebound or guarding. No CVA tenderness  EXT: Full ROM, (+) tenderness over the left lateral posterior scapula. Normal radial pulses.   NEURO: normal motor. normal sensory. CN II-XII intact. Normal gait.  PSYCH: Cooperative, appropriate.

## 2024-02-19 NOTE — ED PROVIDER NOTE - DISPOSITION TYPE
Patient's mother returned call. Writer notified her of Dr. Consuelo Clarke messages below. Mom verbalized understanding and is willing to try to [facility]. They will also eliminate dairy and fructose for 2 weeks and see if her symptoms improve as well. All questions were answered at this time. Prescription for Amitiza was sent to mom's preferred pharmacy. DISCHARGE

## 2024-02-19 NOTE — ED PROVIDER NOTE - PATIENT PORTAL LINK FT
You can access the FollowMyHealth Patient Portal offered by [facility] by registering at the following website: [email or web address]. By joining [employer]’s FollowMyHealth portal, you will also be able to view your health information using other applications (apps) compatible with our system.

## 2024-02-19 NOTE — ED ADULT NURSE NOTE - NSFALLUNIVINTERV_ED_ALL_ED
Bed/Stretcher in lowest position, wheels locked, appropriate side rails in place/Call bell, personal items and telephone in reach/Instruct patient to call for assistance before getting out of bed/chair/stretcher/Non-slip footwear applied when patient is off stretcher/[place] to call system/Physically safe environment - no spills, clutter or unnecessary equipment/Purposeful proactive rounding/Room/bathroom lighting operational, light cord in reach

## 2024-02-26 ENCOUNTER — APPOINTMENT (OUTPATIENT)
Dept: UROLOGY | Facility: CLINIC | Age: 39
End: 2024-02-26

## 2024-02-29 ENCOUNTER — EMERGENCY (EMERGENCY)
Facility: HOSPITAL | Age: 39
LOS: 0 days | Discharge: HOME | End: 2024-02-29
Attending: EMERGENCY MEDICINE
Payer: MEDICAID

## 2024-02-29 VITALS
RESPIRATION RATE: 18 BRPM | HEART RATE: 87 BPM | DIASTOLIC BLOOD PRESSURE: 85 MMHG | HEIGHT: 71 IN | TEMPERATURE: 98 F | SYSTOLIC BLOOD PRESSURE: 143 MMHG | OXYGEN SATURATION: 99 %

## 2024-02-29 DIAGNOSIS — M79.642 PAIN IN LEFT HAND: ICD-10-CM
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
## 2024-02-29 DIAGNOSIS — I10 ESSENTIAL (PRIMARY) HYPERTENSION: ICD-10-CM

## 2024-02-29 PROCEDURE — 73100 X-RAY EXAM OF WRIST: CPT | Mod: LT

## 2024-02-29 PROCEDURE — 99284 EMERGENCY DEPT VISIT MOD MDM: CPT

## 2024-02-29 PROCEDURE — 73100 X-RAY EXAM OF WRIST: CPT | Mod: 26,LT

## 2024-02-29 PROCEDURE — 99283 EMERGENCY DEPT VISIT LOW MDM: CPT | Mod: 25

## 2024-02-29 NOTE — ED PROVIDER NOTE - PHYSICAL EXAMINATION
VITAL SIGNS: I have reviewed nursing notes and confirm.  CONSTITUTIONAL: Well-developed; well-nourished; in no acute distress.  SKIN: Skin exam is warm and dry, no acute rash.  HEAD: Normocephalic; atraumatic.  EYES: Conjunctiva and sclera clear.  EXT: Normal ROM. No clubbing, cyanosis or edema. No hand TTP/deformity. Sensation intact. No swelling. Radial pulses 2+ B/L and equal. Strength 5/5.   NEURO: Alert, oriented. Grossly unremarkable. No focal deficits.

## 2024-02-29 NOTE — ED PROVIDER NOTE - CARE PLAN
Assessment and plan of treatment:	hand pain, paresthesia  imaging, supportive care   1 Principal Discharge DX:	Right hand pain  Assessment and plan of treatment:	hand pain, paresthesia  imaging, supportive care
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
## 2024-02-29 NOTE — ED PROVIDER NOTE - PATIENT PORTAL LINK FT
You can access the FollowMyHealth Patient Portal offered by [facility] by registering at the following website: [email or web address]. By joining [email or web address]’s FollowMyHealth portal, you will also be able to view your health information using other applications (apps) compatible with our system.

## 2024-04-03 ENCOUNTER — EMERGENCY (EMERGENCY)
Facility: HOSPITAL | Age: 39
LOS: 0 days | Discharge: HOME | End: 2024-04-03
Attending: EMERGENCY MEDICINE
Payer: MEDICAID

## 2024-04-03 VITALS
OXYGEN SATURATION: 96 % | HEIGHT: 71 IN | TEMPERATURE: 98 F | WEIGHT: 231.49 LBS | HEART RATE: 97 BPM | DIASTOLIC BLOOD PRESSURE: 102 MMHG | RESPIRATION RATE: 18 BRPM | SYSTOLIC BLOOD PRESSURE: 162 MMHG

## 2024-04-03 VITALS — DIASTOLIC BLOOD PRESSURE: 82 MMHG | SYSTOLIC BLOOD PRESSURE: 137 MMHG

## 2024-04-03 DIAGNOSIS — M54.50 LOW BACK PAIN, UNSPECIFIED: ICD-10-CM

## 2024-04-03 DIAGNOSIS — M25.531 PAIN IN RIGHT WRIST: ICD-10-CM

## 2024-04-03 DIAGNOSIS — M25.532 PAIN IN LEFT WRIST: ICD-10-CM

## 2024-04-03 DIAGNOSIS — K08.89 OTHER SPECIFIED DISORDERS OF TEETH AND SUPPORTING STRUCTURES: ICD-10-CM
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
## 2024-04-03 PROCEDURE — 73110 X-RAY EXAM OF WRIST: CPT | Mod: 50

## 2024-04-03 PROCEDURE — 99284 EMERGENCY DEPT VISIT MOD MDM: CPT

## 2024-04-03 PROCEDURE — 71046 X-RAY EXAM CHEST 2 VIEWS: CPT

## 2024-04-03 PROCEDURE — 72100 X-RAY EXAM L-S SPINE 2/3 VWS: CPT | Mod: 26

## 2024-04-03 PROCEDURE — 99284 EMERGENCY DEPT VISIT MOD MDM: CPT | Mod: 25

## 2024-04-03 PROCEDURE — 71046 X-RAY EXAM CHEST 2 VIEWS: CPT | Mod: 26

## 2024-04-03 PROCEDURE — 73110 X-RAY EXAM OF WRIST: CPT | Mod: 26,50

## 2024-04-03 PROCEDURE — 72100 X-RAY EXAM L-S SPINE 2/3 VWS: CPT

## 2024-04-03 RX ORDER — CYCLOBENZAPRINE HYDROCHLORIDE 10 MG/1
1 TABLET, FILM COATED ORAL
Qty: 5 | Refills: 0
Start: 2024-04-03 | End: 2024-04-07

## 2024-04-03 RX ORDER — CYCLOBENZAPRINE HYDROCHLORIDE 10 MG/1
5 TABLET, FILM COATED ORAL ONCE
Refills: 0 | Status: COMPLETED | OUTPATIENT
Start: 2024-04-03 | End: 2024-04-03

## 2024-04-03 RX ORDER — FLUTICASONE PROPIONATE 50 MCG
1 SPRAY, SUSPENSION NASAL
Qty: 1 | Refills: 0
Start: 2024-04-03 | End: 2024-04-07

## 2024-04-03 RX ADMIN — CYCLOBENZAPRINE HYDROCHLORIDE 5 MILLIGRAM(S): 10 TABLET, FILM COATED ORAL at 03:54

## 2024-04-03 NOTE — ED PROVIDER NOTE - NSFOLLOWUPINSTRUCTIONS_ED_ALL_ED_FT
Dental Pain    Dental pain (toothache) may be caused by many things including tooth decay (cavities or caries), abscess or infection, injury, or the reason may be unknown. Your pain may only occur when you are chewing, are exposed to hot or cold temperature, are eating or drinking sugary foods or beverages, or your pain may be constant. If you were prescribed an antibiotic medicine, finish all of it even if you start to feel better. Rinsing your mouth with salt water or applying ice to the painful area of your face may help with the pain.    SEEK IMMEDIATE MEDICAL CARE IF YOU HAVE THE FOLLOWING SYMPTOMS: unable to open mouth, trouble breathing or swallowing, fever, or swelling of the face, neck or jaw.    Wrist Pain, Adult    There are many things that can cause wrist pain. Some common causes include:    An injury to the wrist area, such as a sprain, strain, or fracture.  Overuse of the joint.  A condition that causes increased pressure on a nerve in the wrist (carpal tunnel syndrome).  Wear and tear of the joints that occurs with aging (osteoarthritis).  A variety of other types of arthritis.    Sometimes, the cause of wrist pain is not known. Often, the pain goes away when you follow instructions from your health care provider for relieving pain at home, such as resting or icing the wrist. If your wrist pain continues, it is important to tell your health care provider.    Follow these instructions at home:  Rest the wrist area for at least 48 hours or as long as told by your health care provider.  If a splint or elastic bandage has been applied, use it as told by your health care provider.    Remove the splint or bandage only as told by your health care provider.  Loosen the splint or bandage if your fingers tingle, become numb, or turn cold or blue.    ImageIf directed, apply ice to the injured area.    If you have a removable splint or elastic bandage, remove it as told by your health care provider.  Put ice in a plastic bag.  Place a towel between your skin and the bag or between your splint or bandage and the bag.  Leave the ice on for 20 minutes, 2–3 times a day.    Keep your arm raised (elevated) above the level of your heart while you are sitting or lying down.  Take over-the-counter and prescription medicines only as told by your health care provider.  Keep all follow-up visits as told by your health care provider. This is important.  Contact a health care provider if:  You have a sudden sharp pain in the wrist, hand, or arm that is different or new.  The swelling or bruising on your wrist or hand gets worse.  Your skin becomes red, gets a rash, or has open sores.  Your pain does not get better or it gets worse.  Get help right away if:  You lose feeling in your fingers or hand.  Your fingers turn white, very red, or cold and blue.  You cannot move your fingers.  You have a fever or chills.  This information is not intended to replace advice given to you by your health care provider. Make sure you discuss any questions you have with your health care provider. Dental Pain    Dental pain (toothache) may be caused by many things including tooth decay (cavities or caries), abscess or infection, injury, or the reason may be unknown. Your pain may only occur when you are chewing, are exposed to hot or cold temperature, are eating or drinking sugary foods or beverages, or your pain may be constant. If you were prescribed an antibiotic medicine, finish all of it even if you start to feel better. Rinsing your mouth with salt water or applying ice to the painful area of your face may help with the pain.    SEEK IMMEDIATE MEDICAL CARE IF YOU HAVE THE FOLLOWING SYMPTOMS: unable to open mouth, trouble breathing or swallowing, fever, or swelling of the face, neck or jaw.    Wrist Pain, Adult    There are many things that can cause wrist pain. Some common causes include:    An injury to the wrist area, such as a sprain, strain, or fracture.  Overuse of the joint.  A condition that causes increased pressure on a nerve in the wrist (carpal tunnel syndrome).  Wear and tear of the joints that occurs with aging (osteoarthritis).  A variety of other types of arthritis.    Sometimes, the cause of wrist pain is not known. Often, the pain goes away when you follow instructions from your health care provider for relieving pain at home, such as resting or icing the wrist. If your wrist pain continues, it is important to tell your health care provider.    Follow these instructions at home:  Rest the wrist area for at least 48 hours or as long as told by your health care provider.  If a splint or elastic bandage has been applied, use it as told by your health care provider.    Remove the splint or bandage only as told by your health care provider.  Loosen the splint or bandage if your fingers tingle, become numb, or turn cold or blue.    ImageIf directed, apply ice to the injured area.    If you have a removable splint or elastic bandage, remove it as told by your health care provider.  Put ice in a plastic bag.  Place a towel between your skin and the bag or between your splint or bandage and the bag.  Leave the ice on for 20 minutes, 2–3 times a day.    Keep your arm raised (elevated) above the level of your heart while you are sitting or lying down.  Take over-the-counter and prescription medicines only as told by your health care provider.  Keep all follow-up visits as told by your health care provider. This is important.  Contact a health care provider if:  You have a sudden sharp pain in the wrist, hand, or arm that is different or new.  The swelling or bruising on your wrist or hand gets worse.  Your skin becomes red, gets a rash, or has open sores.  Your pain does not get better or it gets worse.  Get help right away if:  You lose feeling in your fingers or hand.  Your fingers turn white, very red, or cold and blue.  You cannot move your fingers.  You have a fever or chills.  This information is not intended to replace advice given to you by your health care provider. Make sure you discuss any questions you have with your health care provider.    Back Pain    Back pain is very common in adults. The cause of back pain is rarely dangerous and the pain often gets better over time. The cause of your back pain may not be known and may include strain of muscles or ligaments, degeneration of the spinal disks, or arthritis. Occasionally the pain may radiate down your leg(s). Over-the-counter medicines to reduce pain and inflammation are often the most helpful. Stretching and remaining active frequently helps the healing process.     SEEK IMMEDIATE MEDICAL CARE IF YOU HAVE THE FOLLOWING SYMPTOMS: bowel or bladder control problems, unusual weakness or numbness in your arms or legs, nausea or vomiting, abdominal pain, fever, dizziness/lightheadedness.     Musculoskeletal Pain    Musculoskeletal pain is muscle and jacek aches and pains. These pains can occur in any part of the body. Your caregiver may treat you without knowing the cause of the pain. They may treat you if blood or urine tests, X-rays, and other tests were normal.     CAUSES  There is often not a definite cause or reason for these pains. These pains may be caused by a type of germ (virus). The discomfort may also come from overuse. Overuse includes working out too hard when your body is not fit. Jacek aches also come from weather changes. Bone is sensitive to atmospheric pressure changes.    HOME CARE INSTRUCTIONS  Ask when your test results will be ready. Make sure you get your test results.  Only take over-the-counter or prescription medicines for pain, discomfort, or fever as directed by your caregiver. If you were given medications for your condition, do not drive, operate machinery or power tools, or sign legal documents for 24 hours. Do not drink alcohol. Do not take sleeping pills or other medications that may interfere with treatment.  Continue all activities unless the activities cause more pain. When the pain lessens, slowly resume normal activities. Gradually increase the intensity and duration of the activities or exercise.   During periods of severe pain, bed rest may be helpful. Lay or sit in any position that is comfortable.   Putting ice on the injured area.  Put ice in a bag.   Place a towel between your skin and the bag.  Leave the ice on for 15 to 20 minutes, 3 to 4 times a day.   Follow up with your caregiver for continued problems and no reason can be found for the pain. If the pain becomes worse or does not go away, it may be necessary to repeat tests or do additional testing. Your caregiver may need to look further for a possible cause.    SEEK IMMEDIATE MEDICAL CARE IF:  You have pain that is getting worse and is not relieved by medications.  You develop chest pain that is associated with shortness or breath, sweating, feeling sick to your stomach (nauseous), or throw up (vomit).  Your pain becomes localized to the abdomen.  You develop any new symptoms that seem different or that concern you.    MAKE SURE YOU:  Understand these instructions.   Will watch your condition.  Will get help right away if you are not doing well or get worse.    ADDITIONAL NOTES AND INSTRUCTIONS    Please follow up with your Primary MD in 24-48 hr.  Seek immediate medical care for any new/worsening signs or symptoms.

## 2024-04-03 NOTE — ED PROVIDER NOTE - PATIENT PORTAL LINK FT
You can access the FollowMyHealth Patient Portal offered by [facility] by registering at the following website: [email or web address]. By joining [employer]’s FollowMyHealth portal, you will also be able to view your health information using other applications (apps) compatible with our system.

## 2024-04-03 NOTE — ED PROVIDER NOTE - CARE PLAN
Principal Discharge DX:	Wrist pain   1 Principal Discharge DX:	Wrist pain  Secondary Diagnosis:	Toothache  Secondary Diagnosis:	Back pain
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
## 2024-04-03 NOTE — ED PROVIDER NOTE - PHYSICAL EXAMINATION
CONST: Well appearing in NAD  EYES: PERRL, EOMI, Sclera and conjunctiva clear.   ENT: Misaligned teeth. TTP over the upper incisor no abscess, no erythema or exudates. Uvula midline.  CARD: Normal S1 S2; Normal rate and rhythm  RESP: Equal BS B/L, No wheezes, rhonchi or rales. No distress  GI: Soft, non-tender, non-distended.  MS: Normal ROM in all extremities. No midline spinal tenderness.  SKIN: Warm, dry, no acute rashes.   NEURO: A&Ox3, No focal deficits. Strength 5/5 with no sensory deficits. Steady gait

## 2024-04-03 NOTE — ED ADULT NURSE NOTE - IN THE PAST 12 MONTHS HAVE YOU USED DRUGS OTHER THAN THOSE REQUIRED FOR MEDICAL REASON?
No
Upon my evaluation, this patient had a high probability of imminent or life-threatening deterioration due to status epilepticus, which required my direct attention, intervention, and personal management.  The patient has a medical condition that impairs on or more vital organ systems.  Frequent personal assessment and adjustment of medical interventions was performed.     I have personally provided 30 minutes of critical care time exclusive of time spent on separately billable procedures.  Time includes review of laboratory data, radiology results, discussion with consultants, patient and family; monitoring for potential decompensation, as well as time spent retrieving data and reviewing the chart and documenting the visit.  Interventions were performed as documented above.

## 2024-04-03 NOTE — ED PROVIDER NOTE - CARE PROVIDER_API CALL
Remy Ordoñez  Orthopaedic Surgery  [address] [place] [address]  Phone: [phone]  Fax: [phone]  Follow Up Time: Urgent

## 2024-04-03 NOTE — ED PROVIDER NOTE - CLINICAL SUMMARY MEDICAL DECISION MAKING FREE TEXT BOX
The patient is a 83y Female complaining of  patient remained stable in ED, improved well, results of the diagnostic studies reviewed and discussed with patient, Patient remained awake, alert, ambulatory and comfortable, tolerated PO. Appropriate medications given and effects reassessed.  Discussed with patient in detail about the need for close outpatient follow up and the need to return to ED for any persistent, or worsening symptoms, for any new symptoms/concerns. patient verbalized understanding and agreed. patient is given detail aftercare instructions and is instructed well to f/u as outpatient for further care.

## 2024-04-03 NOTE — ED PROVIDER NOTE - EKG/XRAY ADDITIONAL INFORMATION
Chest X-rays reviewed and interpreted by me Dr. Ramey and shows no actue findings. No Pneumothorax, no free air, no effusions, and these findings discussed with patient.  X-rays reviewed by me and shows no Fractures, no dislocation and no FB noted.

## 2024-04-03 NOTE — ED PROVIDER NOTE - NSFOLLOWUPCLINICSTOKEN_GEN_ALL_ED_FT
021534:Urgent|| ||00\01||False;[id number]:Urgent|| ||00\01||False;527181:Urgent|| ||00\01||False;646304:Urgent|| ||00\01||False;

## 2024-04-03 NOTE — ED ADULT NURSE NOTE - NS ED NURSE LEVEL OF CONSCIOUSNESS MENTAL STATUS
Detail Level: Zone
Left message for patient to call to schedule for tomorrow.  
Ok to see patient tomorrow to evaluate per verbal order from Dr. Modi.  Please call patient to schedule an appointment.  Thanks!  
Patient states she found a lump in her right breast, should she come in to see Dr Modi or get a mammogram? Please call to advise. Patient states it is OK to leave her a voicemail.  
Awake

## 2024-04-03 NOTE — ED PROVIDER NOTE - NSFOLLOWUPCLINICS_GEN_ALL_ED_FT
[facility]  Dental  [address] [place] [address]  Phone: [phone]  Fax:   Follow Up Time: Urgent    [facility]  Medicine  [address], [place]   Phone: [phone]  Fax:   Follow Up Time: Urgent    [facility]  Orthpedic  [address], [place]   Phone: [phone]  Fax:   Follow Up Time: Urgent    [facility] ([facility])  Rehabilitation  [facility] [address], [address] [place] [address]  Phone: [phone]  Fax:   Follow Up Time: Urgent

## 2024-04-04 NOTE — CHART NOTE - NSCHARTNOTEFT_GEN_A_CORE
[facility] MRN [id number] / Left message 4/3 & 4/4 - JEFFREY    Specialty: PCP

## 2024-04-05 ENCOUNTER — EMERGENCY (EMERGENCY)
Facility: HOSPITAL | Age: 39
LOS: 0 days | Discharge: HOME | End: 2024-04-05
Attending: STUDENT IN AN ORGANIZED HEALTH CARE EDUCATION/TRAINING PROGRAM
Payer: MEDICAID

## 2024-04-05 VITALS
TEMPERATURE: 98 F | OXYGEN SATURATION: 100 % | DIASTOLIC BLOOD PRESSURE: 80 MMHG | RESPIRATION RATE: 20 BRPM | SYSTOLIC BLOOD PRESSURE: 180 MMHG | HEART RATE: 110 BPM | WEIGHT: 199.96 LBS | HEIGHT: 71 IN

## 2024-04-05 VITALS
SYSTOLIC BLOOD PRESSURE: 142 MMHG | RESPIRATION RATE: 16 BRPM | DIASTOLIC BLOOD PRESSURE: 83 MMHG | HEART RATE: 99 BPM | OXYGEN SATURATION: 97 % | TEMPERATURE: 99 F

## 2024-04-05 DIAGNOSIS — F29 UNSPECIFIED PSYCHOSIS NOT DUE TO A SUBSTANCE OR KNOWN PHYSIOLOGICAL CONDITION: ICD-10-CM

## 2024-04-05 DIAGNOSIS — K02.9 DENTAL CARIES, UNSPECIFIED: ICD-10-CM

## 2024-04-05 DIAGNOSIS — I10 ESSENTIAL (PRIMARY) HYPERTENSION: ICD-10-CM

## 2024-04-05 DIAGNOSIS — K08.89 OTHER SPECIFIED DISORDERS OF TEETH AND SUPPORTING STRUCTURES: ICD-10-CM

## 2024-04-05 PROCEDURE — 99283 EMERGENCY DEPT VISIT LOW MDM: CPT | Mod: 25

## 2024-04-05 PROCEDURE — 99283 EMERGENCY DEPT VISIT LOW MDM: CPT

## 2024-04-05 PROCEDURE — 96372 THER/PROPH/DIAG INJ SC/IM: CPT

## 2024-04-05 RX ORDER — KETOROLAC TROMETHAMINE 30 MG/ML
30 SYRINGE (ML) INJECTION ONCE
Refills: 0 | Status: COMPLETED | OUTPATIENT
Start: 2024-04-05 | End: 2024-04-05
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
## 2024-04-05 NOTE — ED PROVIDER NOTE - PROGRESS NOTE DETAILS
When seen in ED and the patient is stable for discharge.  I will have patient follow-up with dental clinic outpatient.

## 2024-04-05 NOTE — ED PROVIDER NOTE - PATIENT PORTAL LINK FT
You can access the FollowMyHealth Patient Portal offered by [facility] by registering at the following website: [email or web address]. By joining [employer]’s FollowMyHealth portal, you will also be able to view your health information using other applications (apps) compatible with our system.
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
## 2024-04-05 NOTE — ED PROVIDER NOTE - PHYSICAL EXAMINATION
CONSTITUTIONAL: Well-appearing; in no apparent distress.   Mouth: tooth #10 noticed with cavity; tender to palpation; no abscess or tongue swelling noticed.  RESPIRATORY: No signs of respiratory distress.  CARDIOVASCULAR: Regular rate and rhythm.   NEURO: A & O x 3. Normal speech. No focal deficit.  PSYCHOLOGICAL: Appropriate mood and affect. Good judgement and insight.

## 2024-04-05 NOTE — ED PROVIDER NOTE - NSFOLLOWUPINSTRUCTIONS_ED_ALL_ED_FT
Please make sure to follow up with your primary care doctor in 3 days.    Please make sure to follow up with your dentist or [facility] Monday through Friday from 9am to 4pm, except for holidays.      Dental Pain    Dental pain (toothache) may be caused by many things including tooth decay (cavities or caries), abscess or infection, injury, or the reason may be unknown. Your pain may only occur when you are chewing, are exposed to hot or cold temperature, are eating or drinking sugary foods or beverages, or your pain may be constant. If you were prescribed an antibiotic medicine, finish all of it even if you start to feel better. Rinsing your mouth with salt water or applying ice to the painful area of your face may help with the pain.    SEEK IMMEDIATE MEDICAL CARE IF YOU HAVE THE FOLLOWING SYMPTOMS: unable to open mouth, trouble breathing or swallowing, fever, or swelling of the face, neck or jaw.
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
## 2024-04-15 ENCOUNTER — EMERGENCY (EMERGENCY)
Facility: HOSPITAL | Age: 39
LOS: 0 days | Discharge: HOME | End: 2024-04-15
Attending: EMERGENCY MEDICINE
Payer: MEDICAID

## 2024-04-15 VITALS
SYSTOLIC BLOOD PRESSURE: 136 MMHG | HEART RATE: 67 BPM | TEMPERATURE: 98 F | OXYGEN SATURATION: 100 % | RESPIRATION RATE: 18 BRPM | DIASTOLIC BLOOD PRESSURE: 74 MMHG | HEIGHT: 71 IN

## 2024-04-15 VITALS
DIASTOLIC BLOOD PRESSURE: 75 MMHG | SYSTOLIC BLOOD PRESSURE: 142 MMHG | OXYGEN SATURATION: 100 % | RESPIRATION RATE: 17 BRPM | HEART RATE: 76 BPM
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
## 2024-04-15 DIAGNOSIS — I10 ESSENTIAL (PRIMARY) HYPERTENSION: ICD-10-CM

## 2024-04-15 DIAGNOSIS — G51.0 BELL'S PALSY: ICD-10-CM

## 2024-04-15 DIAGNOSIS — F17.200 NICOTINE DEPENDENCE, UNSPECIFIED, UNCOMPLICATED: ICD-10-CM

## 2024-04-15 DIAGNOSIS — M79.632 PAIN IN LEFT FOREARM: ICD-10-CM

## 2024-04-15 PROCEDURE — 73090 X-RAY EXAM OF FOREARM: CPT | Mod: 26,LT

## 2024-04-15 PROCEDURE — 99284 EMERGENCY DEPT VISIT MOD MDM: CPT | Mod: 25

## 2024-04-15 PROCEDURE — 73090 X-RAY EXAM OF FOREARM: CPT | Mod: LT

## 2024-04-15 PROCEDURE — 73110 X-RAY EXAM OF WRIST: CPT | Mod: LT

## 2024-04-15 PROCEDURE — 73110 X-RAY EXAM OF WRIST: CPT | Mod: 26,LT

## 2024-04-15 PROCEDURE — 99284 EMERGENCY DEPT VISIT MOD MDM: CPT
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
## 2024-04-15 NOTE — ED PROVIDER NOTE - PHYSICAL EXAMINATION
VITAL SIGNS: noted  CONSTITUTIONAL: Well-developed; well-nourished; in no acute distress  HEAD: Normocephalic; atraumatic  EYES: PERRL, EOM intact; conjunctiva and sclera clear  ENT: No nasal discharge; MMM, oropharynx clear   NECK: Supple; non tender. No anterior cervical lymphadenopathy noted  CARD: S1, S2 normal; no murmurs, gallops, or rubs. Regular rate and rhythm  RESP: CTAB/L, no wheezes, rales or rhonchi  ABD: Normal bowel sounds; soft; non-distended; non-tender; no organomegaly. No CVA tenderness  EXT: Normal ROM. No calf tenderness or edema. Distal pulses intact, capillary refill <2sec. +small distal forearm posterior superficial scratch. no bleeding. no ecchymosis no edema  mild ttp.   NEURO: Awake and alert, oriented. Grossly unremarkable. No focal deficits.   SKIN: Skin exam is warm and dry, no acute rash

## 2024-04-15 NOTE — ED PROVIDER NOTE - PATIENT PORTAL LINK FT
You can access the FollowMyHealth Patient Portal offered by [facility] by registering at the following website: [email or web address]. By joining [employer]’s FollowMyHealth portal, you will also be able to view your health information using other applications (apps) compatible with our system.

## 2024-04-15 NOTE — ED PROVIDER NOTE - PROGRESS NOTE DETAILS
Sh  Pt A&Ox 3   Pt ambulating at baseline  xrays reviewed   plan to d/c f/u with pcp  strict ed return precautions given

## 2024-04-16 ENCOUNTER — EMERGENCY (EMERGENCY)
Facility: HOSPITAL | Age: 39
LOS: 0 days | Discharge: HOME | End: 2024-04-16
Attending: EMERGENCY MEDICINE
Payer: MEDICAID

## 2024-04-16 VITALS
SYSTOLIC BLOOD PRESSURE: 110 MMHG | RESPIRATION RATE: 18 BRPM | HEART RATE: 87 BPM | DIASTOLIC BLOOD PRESSURE: 78 MMHG | OXYGEN SATURATION: 100 %

## 2024-04-16 VITALS
RESPIRATION RATE: 19 BRPM | DIASTOLIC BLOOD PRESSURE: 71 MMHG | HEIGHT: 71 IN | TEMPERATURE: 99 F | OXYGEN SATURATION: 98 % | HEART RATE: 118 BPM | SYSTOLIC BLOOD PRESSURE: 118 MMHG

## 2024-04-16 VITALS
SYSTOLIC BLOOD PRESSURE: 109 MMHG | WEIGHT: 184.97 LBS | DIASTOLIC BLOOD PRESSURE: 64 MMHG | TEMPERATURE: 98 F | OXYGEN SATURATION: 100 % | HEART RATE: 95 BPM | HEIGHT: 71 IN | RESPIRATION RATE: 20 BRPM

## 2024-04-16 VITALS — HEART RATE: 99 BPM

## 2024-04-16 DIAGNOSIS — M79.639 PAIN IN UNSPECIFIED FOREARM: ICD-10-CM

## 2024-04-16 DIAGNOSIS — F29 UNSPECIFIED PSYCHOSIS NOT DUE TO A SUBSTANCE OR KNOWN PHYSIOLOGICAL CONDITION: ICD-10-CM

## 2024-04-16 DIAGNOSIS — I10 ESSENTIAL (PRIMARY) HYPERTENSION: ICD-10-CM

## 2024-04-16 DIAGNOSIS — G51.0 BELL'S PALSY: ICD-10-CM

## 2024-04-16 DIAGNOSIS — F17.200 NICOTINE DEPENDENCE, UNSPECIFIED, UNCOMPLICATED: ICD-10-CM

## 2024-04-16 DIAGNOSIS — F10.10 ALCOHOL ABUSE, UNCOMPLICATED: ICD-10-CM

## 2024-04-16 DIAGNOSIS — F19.129 OTHER PSYCHOACTIVE SUBSTANCE ABUSE WITH INTOXICATION, UNSPECIFIED: ICD-10-CM

## 2024-04-16 DIAGNOSIS — M79.645 PAIN IN LEFT FINGER(S): ICD-10-CM

## 2024-04-16 PROCEDURE — 99282 EMERGENCY DEPT VISIT SF MDM: CPT | Mod: 25

## 2024-04-16 PROCEDURE — 99283 EMERGENCY DEPT VISIT LOW MDM: CPT

## 2024-04-16 NOTE — ED PROVIDER NOTE - PHYSICAL EXAMINATION
VITAL SIGNS: I have reviewed nursing notes and confirm.  CONSTITUTIONAL: Well-developed; well-nourished; in no acute distress.  SKIN: Skin exam is warm and dry, no acute rash.  HEAD: Normocephalic; atraumatic.  EYES: PERRL, conjunctiva and sclera clear.  ENT: mmm  CARD: S1, S2 normal; no murmurs, gallops, or rubs. Regular rate and rhythm.  RESP: Normal respiratory effort, no tachypnea or distress. Lungs CTAB, no wheezes, rales or rhonchi.  ABD: soft, NT/ND.  EXT: FROM of all joints including L index finger mcp, pip, dip with no pain, no tenderness or deformity, no skin changes noted. 2+RP. Ambulatory with stable gait  NEURO: Alert, oriented. Grossly unremarkable. No focal deficits.  PSYCH: Cooperative, appropriate.
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
## 2024-04-16 NOTE — ED ADULT NURSE NOTE - NSFALLUNIVINTERV_ED_ALL_ED
Bed/Stretcher in lowest position, wheels locked, appropriate side rails in place/Call bell, personal items and telephone in reach/Instruct patient to call for assistance before getting out of bed/chair/stretcher/Non-slip footwear applied when patient is off stretcher/[place] to call system/Physically safe environment - no spills, clutter or unnecessary equipment/Purposeful proactive rounding/Room/bathroom lighting operational, light cord in reach

## 2024-04-16 NOTE — ED PROVIDER NOTE - PROGRESS NOTE DETAILS
[place] - s/o Dr. Mcclure Patient signed out to me by Dr. Whyte, reassess and dispo. Sh  Pt reassessed  ambulating at baseline  denies any SI/HI ideations, auditory or visual hallucinations  pt requesting to leave  VSS  strict ed return precautions given
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
## 2024-04-16 NOTE — ED PROVIDER NOTE - PATIENT PORTAL LINK FT
You can access the FollowMyHealth Patient Portal offered by [facility] by registering at the following website: [email or web address]. By joining [employer]’s FollowMyHealth portal, you will also be able to view your health information using other applications (apps) compatible with our system.

## 2024-04-16 NOTE — ED ADULT NURSE NOTE - NSFALLHARMRISKINTERV_ED_ALL_ED
Assistance OOB with selected safe patient handling equipment if applicable/Assistance with ambulation/Communicate risk of Fall with Harm to all staff, patient, and family/Monitor gait and stability/Monitor for mental status changes and reorient to person, place, and time, as needed/Provide visual cue: red socks, yellow wristband, yellow gown, etc/Reinforce activity limits and safety measures with patient and family/Toileting schedule using arm’s reach rule for commode and bathroom/Use of alarms - bed, stretcher, chair and/or video monitoring/Bed in lowest position, wheels locked, appropriate side rails in place/Call bell, personal items and telephone in reach/Instruct patient to call for assistance before getting out of bed/chair/stretcher/Non-slip footwear applied when patient is off stretcher/[place] to call system/Physically safe environment - no spills, clutter or unnecessary equipment/Purposeful Proactive Rounding/Room/bathroom lighting operational, light cord in reach

## 2024-04-16 NOTE — ED PROVIDER NOTE - PATIENT PORTAL LINK FT
You can access the FollowMyHealth Patient Portal offered by [facility] by registering at the following website: [email or web address]. By joining [employer]’s FollowMyHealth portal, you will also be able to view your health information using other applications (apps) compatible with our system.
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
## 2024-04-16 NOTE — ED PROVIDER NOTE - CARE PLAN
1 Principal Discharge DX:	General medical exam   Principal Discharge DX:	General medical exam  Assessment and plan of treatment:	etoh abuse  supportive care

## 2024-04-16 NOTE — ED PROVIDER NOTE - PHYSICAL EXAMINATION
VITAL SIGNS: noted  CONSTITUTIONAL: Well-developed; well-nourished; in no acute distress  HEAD: Normocephalic; atraumatic  EYES: PERRL, EOM intact; conjunctiva and sclera clear  ENT: No nasal discharge; MMM, oropharynx clear without tonsillar hypertrophy or exudates  NECK: Supple; non tender. No anterior cervical lymphadenopathy noted, no midline ttp. FROM  CARD: S1, S2 normal; no murmurs, gallops, or rubs. Regular rate and rhythm  Back: No midline ttp. no step offs  RESP: CTAB/L, no wheezes, rales or rhonchi  ABD: Normal bowel sounds; soft; non-distended; non-tender; no organomegaly.   EXT: Normal ROM. No calf tenderness or edema. Distal pulses intact  NEURO: Awake and alert. slurring speech but following commands. Grossly unremarkable. No focal deficits. unsteady gait.   SKIN: Skin exam is warm and dry, no acute rash

## 2024-04-16 NOTE — ED PROVIDER NOTE - ATTENDING CONTRIBUTION TO CARE
I have personally seen and examined this patient.  I have fully participated in the care of this patient. I have reviewed all pertinent clinical information, including history, physical exam, plan and the resident phycisian's note and agree except as noted.
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
## 2024-04-16 NOTE — ED PROVIDER NOTE - NSFOLLOWUPINSTRUCTIONS_ED_ALL_ED_FT
Substance Use Disorder and Mental Illness  Substance use disorder is a condition in which a person is dependent on a substance, such as drugs or alcohol. A mental illness is a condition that occurs when someone experiences changes in mood, behavior, or thinking. Sometimes, these two conditions can occur at the same time (co-occurring disorders) and may be diagnosed together (dual diagnosis).    What is the relationship between substance use disorder and mental illness?  Substance use disorder and mental illness can share symptoms and can have similar causes, such as exposure to stress or changes in brain chemicals. The risk for developing both of these conditions can be passed from parent to child (inherited). People with mental illnesses sometimes use drugs to try to relieve symptoms, and this can lead to substance use disorder. Substance use disorders occur more often in people who have depression, schizophrenia, anxiety, or personality disorders.    Also, when some drugs are used regularly, they can cause people to have symptoms of mental illness.    What are the signs or symptoms?  Symptoms vary widely for substance use disorder and mental illness, especially because these conditions can be present at the same time.    Signs of a substance use disorder    Failure to meet responsibilities at home, work, or school.  Using substances in risky situations, such as while driving or using machinery.  Taking serious risks to get drugs or alcohol.  Spending less time on activities or hobbies that used to be important.  Changes in personality or attitude for no reason, such as angry outbursts, symptoms of anxiety, or unusual giddiness.  Trying to hide the amount of drugs or alcohol used.  Increased substance use over time, or needing to use more of a substance to feel the same effects (developing a tolerance).  Physical symptoms may include:  Sudden weight loss or gain.  Sleeping too much or too little.  Uncontrolled trembling or shaking (tremors), slurred speech, or lack of coordination.  Continuing to use alcohol or a drug even though using it has led to bad outcomes or consequences, such as losing a job or ending a relationship.  Signs and symptoms of mental illness    Withdrawing from friends and family, or sudden changes in social behaviors or hobbies.  Aggression toward people and animals.  Repeatedly breaking serious rules or breaking the law.  Persistent feelings of sadness, hopelessness, or thoughts of suicide.  Having persistent thoughts or urges that are unpleasant or feel out of control (involuntary). The person may feel the need to act on the urges in order to reduce anxiety.  False beliefs (delusions).  Seeing, hearing, tasting, smelling, or feeling things that are not real (hallucinations).  Other signs include:  Sleeping too much or too little.  Weight loss or weight gain.  Being easily distracted.  Extreme mood changes (mood swings).  Confused thinking or trouble concentrating.  How is this diagnosed?  Substance use disorder and mental illnesses can be difficult to diagnose at the same time because of how varied and complex the symptoms are. Because these conditions can interact, one condition may be missed. This is why it is important to be completely honest with your health care provider about substance use and your other symptoms.    Diagnosing your condition may include:  A physical exam.  A review of your medical history and your symptoms.  Your health care provider may refer you to a mental health professional for a psychiatric evaluation. This may include assessments of:  Your use of substances.  Your risk of suicide.  Your risk of aggressive behaviors.  Your lifestyle, environment, and social situations.  Your medical health.  Your mental health and behavioral history.  How is this treated?  Three people participating in a support group.  It is best to treat substance use disorder and mental illness at the same time (integrated treatment approach). Treatment usually involves more than one of the following methods:  Detox. This refers to stopping substance abuse while being monitored by trained medical staff. This is usually the first step in treatment. Detox can last for up to 7 days.  Rehabilitation. This involves staying in a treatment center where you can have medical and mental health support all the time.  Medicines to relieve symptoms of mental illness and to control symptoms that are caused by stopping substance abuse (withdrawal symptoms).  Support groups. These groups encourage you to talk about your fears, frustrations, and anxieties with others who have the same condition.  Talk therapy. This is one-on-one therapy that can help you learn about your illness and learn ways to cope with symptoms or side effects.  Follow these instructions at home:  Medicines    Take over-the-counter and prescription medicines only as told by your health care provider.  Do not stop taking medicines unless you ask your health care provider if it is safe to do that.  Tell your health care provider about any medicine side effects that you experience.  Lifestyle    Exercise regularly. Aim for 150 minutes of moderate exercise (such as walking or biking) or 75 minutes of vigorous exercise (such as running) each week.  Eat a healthy diet with plenty of fruits and vegetables, whole grains, and lean proteins.  Avoid caffeine and tobacco. These can worsen symptoms and anxiety.  Do not drink alcohol or use drugs.  Try to get 7–9 hours of sleep each night. To do this:  Keep your bedroom cool and dark.  Do not eat a heavy meal during the hour before you go to bed.  Do not have caffeine before bedtime.  Avoid screen time during the few hours before bedtime. This means not watching TV and not using a computer, mobile phone, or tablet.  General instructions    Follow your treatment plan as directed. Work with your health care provider to adjust your treatment plan as needed.  Attend support group or therapy sessions as directed.  Explain your diagnosis to your friends and family. Let them know what your symptoms are and what things cause your symptoms to start (triggers).  Avoid triggers or stressors that may worsen your symptoms or cause you to use alcohol or drugs. Spend time with family and friends who do not use substances.  Make time to relax and do self-soothing activities, such as meditating or listening to music.  Keep all follow-up visits. This is important.  Where to find support  You may find support for coping with substance use disorder and mental illness from:  Your health care providers or your therapist. These providers can treat you or can help you find services to treat your condition.  Local support groups for people with your condition. Your health care provider or therapist may be able to recommend a support group. This may be a hospital support group, a [place] on Mental Illness (ZULEMA) support group, or a 12-step group such as Alcoholics Anonymous (AA) or Narcotics Anonymous (NA).  Family and friends. Let them know what they can do to best support you through your recovery process.  Where to find more information  Substance Abuse and Mental Health Services Administration (SAMHSA):  Online: www.samhsa.gov  National Helpline, to talk with a person who can help you find information about treatment services in your area: 1-884.921.8425 (5-077-XZJOXY2)  U.S. Department of Health and Human Services mental health services: www.mentalhealth.gov  National [place] on Mental Illness (ZULEMA): www.zulema.org  Contact a health care provider if:  Your symptoms get worse or they do not get better.  You have negative side effects from taking medicines.  You want to discuss stopping medicines or treatment.  You start using a substance again (have a relapse).  Get help right away if:  You have thoughts about harming yourself or others.  If you ever feel like you may hurt yourself or others, or have thoughts about taking your own life, get help right away. Go to your nearest emergency department or:  Call your local emergency services (986 in the [place]  Call a suicide crisis helpline, such as the National Suicide Prevention Lifeline at 1-987.274.8700 or 115 in the [place] This is open 24 hours a day in the [place]  Text the Crisis Text Line at 746676 (in the [place]  Summary  Substance use disorder and mental illness can occur at the same time (co-occurring disorders), and they may be diagnosed together (dual diagnosis).  These conditions can be difficult to diagnose at the same time. It is important to be completely honest with your health care provider about your substance use and your other symptoms.  It is best to treat substance use disorder and mental illness at the same time (integrated treatment approach).  Identifying new ways to deal with triggers and difficult situations is an important part of recovery.  Keep all follow-up visits. Attend support groups or treatment programs as directed.  This information is not intended to replace advice given to you by your health care provider. Make sure you discuss any questions you have with your health care provider.
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
## 2024-04-16 NOTE — ED ADULT TRIAGE NOTE - NS ED TRIAGE AVPU SCALE
Alert-The patient is alert, awake and responds to voice. The patient is oriented to time, place, and person. The triage nurse is able to obtain subjective information. Previously Declined (within the last year)

## 2024-04-16 NOTE — ED PROVIDER NOTE - NSFOLLOWUPCLINICS_GEN_ALL_ED_FT
[facility]  Detox Mgmt  [address] [place] [address]  Phone: [phone]  Fax:

## 2024-04-18 ENCOUNTER — EMERGENCY (EMERGENCY)
Facility: HOSPITAL | Age: 39
LOS: 0 days | Discharge: HOME | End: 2024-04-18
Attending: EMERGENCY MEDICINE
Payer: MEDICAID

## 2024-04-18 VITALS — HEIGHT: 71 IN

## 2024-04-18 DIAGNOSIS — F29 UNSPECIFIED PSYCHOSIS NOT DUE TO A SUBSTANCE OR KNOWN PHYSIOLOGICAL CONDITION: ICD-10-CM

## 2024-04-18 DIAGNOSIS — G51.0 BELL'S PALSY: ICD-10-CM

## 2024-04-18 DIAGNOSIS — R20.2 PARESTHESIA OF SKIN: ICD-10-CM

## 2024-04-18 DIAGNOSIS — I10 ESSENTIAL (PRIMARY) HYPERTENSION: ICD-10-CM

## 2024-04-18 PROCEDURE — 99283 EMERGENCY DEPT VISIT LOW MDM: CPT

## 2024-04-18 PROCEDURE — 99282 EMERGENCY DEPT VISIT SF MDM: CPT

## 2024-04-18 NOTE — ED PROVIDER NOTE - NSFOLLOWUPINSTRUCTIONS_ED_ALL_ED_FT
Please return to the emergency room if you have any worsening symptoms including fever, dizziness, chest pain, shortness of breath, abdominal pain, nausea, vomiting, pain not controlled with medication

## 2024-04-18 NOTE — ED PROVIDER NOTE - PATIENT PORTAL LINK FT
You can access the FollowMyHealth Patient Portal offered by [facility] by registering at the following website: [email or web address]. By joining [employer]’s FollowMyHealth portal, you will also be able to view your health information using other applications (apps) compatible with our system.
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
## 2024-04-18 NOTE — ED PROVIDER NOTE - DIFFERENTIAL DIAGNOSIS
The differential diagnosis for patients clinical presentation includes but is not limited to:  fracture, cellulitis, RSD Differential Diagnosis

## 2024-04-18 NOTE — ED PROVIDER NOTE - PHYSICAL EXAMINATION
VITAL SIGNS: I have reviewed nursing notes and confirm.  CONSTITUTIONAL: Well-developed; well-nourished; in no acute distress.  SKIN: Skin exam is warm and dry, no acute rash.  HEAD: Normocephalic; atraumatic.  EYES: PERRL, EOM intact; conjunctiva and sclera clear.  ENT: airway clear.   NECK: Supple  CARD: S1, S2 normal; no murmurs, gallops, or rubs. Regular rate and rhythm.  RESP: No wheezes, rales or rhonchi.  ABD: Normal bowel sounds; soft; non-distended; non-tender  EXT: Normal ROM. No tenderness noted at b/l wrist or hands. Good capillary refill. Multiple rings noted to multiple fingers.  NEURO: Alert, oriented. Gait stable.  PSYCH: Cooperative, appropriate.
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
## 2024-07-02 ENCOUNTER — APPOINTMENT (OUTPATIENT)
Dept: NEUROLOGY | Facility: CLINIC | Age: 39
End: 2024-07-02

## 2024-09-20 NOTE — ED PROVIDER NOTE - WR ORDER DATE AND TIME 2
09/20/24 1103   Medicare Message   Important Message from Medicare regarding Discharge Appeal Rights Given to patient/caregiver;Explained to patient/caregiver;Signed/date by patient/caregiver   Date IMM was signed 09/20/24   Time IMM was signed 1100        15-Apr-2024 08:27

## 2024-10-04 NOTE — REVIEW OF SYSTEMS
Pt needs medication refill and sent to new pharmacy [facility] pharmacy he said 90 days on all the medication   Amlodipine 5mg   Atorvastatin 80 mg  Clopidogrel 75 mg  Furosemide 20 mg  Lisinopril 20 mg  Metoprolol 50 mg  Potassium 20 meq  Sildenafil 50mg    [As Noted in HPI] : as noted in HPI

## 2024-10-18 NOTE — ED ADULT NURSE NOTE - NS TRANSFER RESPONSE BELONGING
Patient eligible for bessie risk screen age>75? yes    Health care proxy paperwork given to patient? Yes (all patients should be given the packet to fill out at home and return on day of surgery to pre-op RN) No    Impaired mobility (ie: uses cane, walker, wheelchair, or assist device)? NO    Known dementia diagnosis? NO    Impaired functional status (METS<4)? NO     Malnutrition BMI<20? No
yes
